# Patient Record
Sex: MALE | Race: WHITE | Employment: UNEMPLOYED | URBAN - METROPOLITAN AREA
[De-identification: names, ages, dates, MRNs, and addresses within clinical notes are randomized per-mention and may not be internally consistent; named-entity substitution may affect disease eponyms.]

---

## 2017-02-07 ENCOUNTER — TELEPHONE (OUTPATIENT)
Dept: FAMILY MEDICINE CLINIC | Facility: CLINIC | Age: 52
End: 2017-02-07

## 2017-02-07 PROBLEM — I10 ESSENTIAL HYPERTENSION: Status: ACTIVE | Noted: 2017-02-07

## 2017-02-07 PROBLEM — E66.3 OVERWEIGHT: Status: ACTIVE | Noted: 2017-02-07

## 2017-02-07 PROBLEM — F17.200 TOBACCO USE DISORDER: Status: ACTIVE | Noted: 2017-02-07

## 2017-02-07 RX ORDER — ALBUTEROL SULFATE 90 UG/1
AEROSOL, METERED RESPIRATORY (INHALATION) AS NEEDED
COMMUNITY
Start: 2012-10-31 | End: 2017-12-27

## 2017-02-07 RX ORDER — ALBUTEROL SULFATE 2.5 MG/3ML
SOLUTION RESPIRATORY (INHALATION) AS NEEDED
COMMUNITY
Start: 2012-11-14 | End: 2017-12-27

## 2017-02-07 RX ORDER — ATORVASTATIN CALCIUM 10 MG/1
10 TABLET, FILM COATED ORAL NIGHTLY
COMMUNITY
Start: 2013-12-04 | End: 2017-03-14

## 2017-02-07 RX ORDER — VALSARTAN AND HYDROCHLOROTHIAZIDE 320; 25 MG/1; MG/1
1 TABLET, FILM COATED ORAL DAILY
COMMUNITY
Start: 2013-12-04 | End: 2017-05-15

## 2017-02-07 RX ORDER — LEVOTHYROXINE SODIUM 0.12 MG/1
125 TABLET ORAL
COMMUNITY
Start: 2016-05-23 | End: 2017-03-14

## 2017-02-07 RX ORDER — TRAMADOL HYDROCHLORIDE 50 MG/1
TABLET ORAL
COMMUNITY
Start: 2016-05-19 | End: 2017-10-10

## 2017-02-07 NOTE — TELEPHONE ENCOUNTER
Pt stated that mole on left arm is changing, pt has noticed growth (approx pea size) and reports red edging. Pt asked for appt w/ CR only, appt given w/ CR on 2/14.     Future Appointments  Date Time Provider Willem Romero   2/14/2017 11:15 AM [place]

## 2017-02-14 ENCOUNTER — OFFICE VISIT (OUTPATIENT)
Dept: FAMILY MEDICINE CLINIC | Facility: CLINIC | Age: 52
End: 2017-02-14

## 2017-02-14 ENCOUNTER — APPOINTMENT (OUTPATIENT)
Dept: LAB | Age: 52
End: 2017-02-14
Attending: FAMILY MEDICINE
Payer: COMMERCIAL

## 2017-02-14 VITALS
HEIGHT: 69.5 IN | BODY MASS INDEX: 42.59 KG/M2 | TEMPERATURE: 97 F | DIASTOLIC BLOOD PRESSURE: 68 MMHG | WEIGHT: 294.19 LBS | HEART RATE: 80 BPM | RESPIRATION RATE: 16 BRPM | SYSTOLIC BLOOD PRESSURE: 106 MMHG

## 2017-02-14 DIAGNOSIS — L82.1 KERATOSIS SEBORRHEICA: Primary | ICD-10-CM

## 2017-02-14 DIAGNOSIS — E03.9 HYPOTHYROIDISM, UNSPECIFIED TYPE: ICD-10-CM

## 2017-02-14 LAB — TSI SER-ACNC: 0.72 MIU/ML (ref 0.35–5.5)

## 2017-02-14 PROCEDURE — 17110 DESTRUCTION B9 LES UP TO 14: CPT | Performed by: FAMILY MEDICINE

## 2017-02-14 PROCEDURE — 84443 ASSAY THYROID STIM HORMONE: CPT

## 2017-02-14 PROCEDURE — 99212 OFFICE O/P EST SF 10 MIN: CPT | Performed by: FAMILY MEDICINE

## 2017-02-14 RX ORDER — GABAPENTIN 100 MG/1
100 CAPSULE ORAL
COMMUNITY
End: 2017-05-10 | Stop reason: DRUGHIGH

## 2017-02-14 NOTE — PROGRESS NOTES
[facility] [place]  PROGRESS NOTE  Chief Complaint:   Patient presents with:  Skin: mole changing- is larged with red edging- L forearm      HPI:   This is a 50year old male coming in for eval of skin lesion.  Pt noted lesion initially brown and Oral Tab Take 10 mg by mouth nightly.    Disp:  Rfl:    Levothyroxine Sodium (SYNTHROID) 125 MCG Oral Tab Take 125 mcg by mouth before breakfast.   Disp:  Rfl:    TraMADol HCl 50 MG Oral Tab 1-2 tabs   1-2 times per day  Disp:  Rfl:    Valsartan-Hydrochloro 02/14/1967  Diabetes Care Dilated Eye Exam due on 02/14/1967  Annual Physical due on 02/14/1969  Influenza Vaccine(1) due on 09/01/2016  Colonoscopy,10 Years due on 02/14/2017  PSA due on 02/14/2017      Patient/Caregiver Education: Patient/Caregiver Anny Michael

## 2017-02-14 NOTE — PATIENT INSTRUCTIONS
Keep skin clean and dry. Antibiotic ointment bid to area til healed.  Recheck if redness, drainage or other concern

## 2017-02-14 NOTE — PROCEDURES
Skin lesion right forearm , cleaned and had small amount of k-Y applied. Cryotherapy to area for 20 seconds x 2 applications  Skin lesion left forearm, cleaned and applied small amount K_Y. Cryotherapy to area for 30 seconds x 2 applications.   Pt tolerated

## 2017-02-15 ENCOUNTER — TELEPHONE (OUTPATIENT)
Dept: FAMILY MEDICINE CLINIC | Facility: CLINIC | Age: 52
End: 2017-02-15

## 2017-02-15 NOTE — TELEPHONE ENCOUNTER
----- Message from Marley England MD sent at 2/14/2017  7:26 PM CST -----  tsh now in low normal range. Pt to continue present dose of synthroid.

## 2017-03-15 RX ORDER — ATORVASTATIN CALCIUM 10 MG/1
TABLET, FILM COATED ORAL
Qty: 90 TABLET | Refills: 0 | Status: SHIPPED | OUTPATIENT
Start: 2017-03-15 | End: 2017-08-07

## 2017-03-15 RX ORDER — LEVOTHYROXINE SODIUM 0.12 MG/1
TABLET ORAL
Qty: 90 TABLET | Refills: 0 | Status: SHIPPED | OUTPATIENT
Start: 2017-03-15 | End: 2017-06-20

## 2017-05-10 ENCOUNTER — HOSPITAL ENCOUNTER (OUTPATIENT)
Dept: GENERAL RADIOLOGY | Age: 52
Discharge: HOME | End: 2017-05-10
Attending: FAMILY MEDICINE
Payer: COMMERCIAL

## 2017-05-10 ENCOUNTER — OFFICE VISIT (OUTPATIENT)
Dept: FAMILY MEDICINE CLINIC | Facility: CLINIC | Age: 52
End: 2017-05-10

## 2017-05-10 ENCOUNTER — TELEPHONE (OUTPATIENT)
Dept: FAMILY MEDICINE CLINIC | Facility: CLINIC | Age: 52
End: 2017-05-10

## 2017-05-10 VITALS
WEIGHT: 302.63 LBS | DIASTOLIC BLOOD PRESSURE: 68 MMHG | SYSTOLIC BLOOD PRESSURE: 102 MMHG | TEMPERATURE: 98 F | RESPIRATION RATE: 20 BRPM | HEIGHT: 69.25 IN | HEART RATE: 92 BPM | BODY MASS INDEX: 44.31 KG/M2

## 2017-05-10 DIAGNOSIS — M19.042 PRIMARY OSTEOARTHRITIS OF LEFT HAND: ICD-10-CM

## 2017-05-10 DIAGNOSIS — M79.645 THUMB PAIN, LEFT: ICD-10-CM

## 2017-05-10 DIAGNOSIS — M79.645 THUMB PAIN, LEFT: Primary | ICD-10-CM

## 2017-05-10 DIAGNOSIS — M65.242: ICD-10-CM

## 2017-05-10 DIAGNOSIS — M72.2 PLANTAR FASCIITIS, BILATERAL: ICD-10-CM

## 2017-05-10 PROCEDURE — 73140 X-RAY EXAM OF FINGER(S): CPT | Performed by: FAMILY MEDICINE

## 2017-05-10 PROCEDURE — 99214 OFFICE O/P EST MOD 30 MIN: CPT | Performed by: FAMILY MEDICINE

## 2017-05-10 RX ORDER — GABAPENTIN 300 MG/1
300 CAPSULE ORAL DAILY
Refills: 1 | COMMUNITY
Start: 2017-03-31

## 2017-05-10 NOTE — PATIENT INSTRUCTIONS
Nsaid --for 2 weeks    2 aleve bid with food for 2 weeks  Ice after working    If no better 2 weeks- then referral- hand surgeon    Podiatry senait at [facility] for plantar fascititis

## 2017-05-10 NOTE — PROGRESS NOTES
Patricia Abdalla is a 50year old male. HPI:  Thumb pain over ast 2-3 weeks. In last week -seems to \"go out of joint\" got stuck, massaged and went back in positions    No numnbnness  No injury recalled.  Takes nsaids chronically for back pain- no extra Temp(Src) 98.1 °F (36.7 °C) (Tympanic)  Resp 20  Ht 69.25\"  Wt 302 lb 10 oz  BMI 44.37 kg/m2  GENERAL: well developed, well nourished,in no apparent distress  EXTREMITIES: left thum tender Proximal pip joint. No swelling some decrease opposition.   Feet- f

## 2017-05-11 NOTE — TELEPHONE ENCOUNTER
----- Message from Susy Elizondo MD sent at 5/10/2017  5:36 PM CDT -----  Negative xray0 pt to continue with nsaids, ice and rest as discussed at appt today

## 2017-05-15 RX ORDER — VALSARTAN AND HYDROCHLOROTHIAZIDE 320; 25 MG/1; MG/1
TABLET, FILM COATED ORAL
Qty: 90 TABLET | Refills: 2 | Status: SHIPPED | OUTPATIENT
Start: 2017-05-15 | End: 2017-10-10

## 2017-06-05 ENCOUNTER — TELEPHONE (OUTPATIENT)
Dept: FAMILY MEDICINE CLINIC | Facility: CLINIC | Age: 52
End: 2017-06-05

## 2017-06-05 DIAGNOSIS — M79.645 THUMB PAIN, LEFT: Primary | ICD-10-CM

## 2017-06-05 NOTE — TELEPHONE ENCOUNTER
According to Dr. Jeremiah Arreola's note-she did want him to see a hand surgeon. Referral entered for Evaristo Butler name is on there for patients should see hand specialist.  Please give patient contact information for [facility].   Also patient should  a

## 2017-06-05 NOTE — TELEPHONE ENCOUNTER
Pt was seen on 5/10- states he hasn't noticed any improvement with NSAID therapy re: L thumb pain. Plan from 5/10- mentioned referral to hand surgeon if no improvement, please advise.

## 2017-06-20 RX ORDER — LEVOTHYROXINE SODIUM 0.12 MG/1
TABLET ORAL
Qty: 90 TABLET | Refills: 2 | Status: SHIPPED | OUTPATIENT
Start: 2017-06-20 | End: 2017-10-10

## 2017-08-07 DIAGNOSIS — E11.9 CONTROLLED TYPE 2 DIABETES MELLITUS WITHOUT COMPLICATION, WITHOUT LONG-TERM CURRENT USE OF INSULIN (HCC): Primary | ICD-10-CM

## 2017-08-08 RX ORDER — ATORVASTATIN CALCIUM 10 MG/1
TABLET, FILM COATED ORAL
Qty: 90 TABLET | Refills: 0 | Status: SHIPPED | OUTPATIENT
Start: 2017-08-08 | End: 2017-10-10

## 2017-08-08 NOTE — TELEPHONE ENCOUNTER
Last physical was in May 2016, please notify patient is due for a physical and fasting lab work.   He may schedule his lab appointment a few days before his physical.  Refill for atorvastatin was sent

## 2017-08-09 NOTE — TELEPHONE ENCOUNTER
Future Appointments  Date Time Provider Willem Heather   9/7/2017 10:45 AM REF [place] [facility]   9/12/2017 2:00 PM Chao Esqueda MD [facility] [place] [facility] [place]     Pt called back, is now scheduled, will need fasting last order please.

## 2017-10-03 ENCOUNTER — LABORATORY ENCOUNTER (OUTPATIENT)
Dept: LAB | Age: 52
End: 2017-10-03
Attending: FAMILY MEDICINE
Payer: COMMERCIAL

## 2017-10-03 DIAGNOSIS — E11.9 CONTROLLED TYPE 2 DIABETES MELLITUS WITHOUT COMPLICATION, WITHOUT LONG-TERM CURRENT USE OF INSULIN (HCC): ICD-10-CM

## 2017-10-03 PROCEDURE — 84439 ASSAY OF FREE THYROXINE: CPT | Performed by: FAMILY MEDICINE

## 2017-10-03 PROCEDURE — 80061 LIPID PANEL: CPT | Performed by: FAMILY MEDICINE

## 2017-10-03 PROCEDURE — 82550 ASSAY OF CK (CPK): CPT | Performed by: FAMILY MEDICINE

## 2017-10-03 PROCEDURE — 82043 UR ALBUMIN QUANTITATIVE: CPT | Performed by: FAMILY MEDICINE

## 2017-10-03 PROCEDURE — 82570 ASSAY OF URINE CREATININE: CPT | Performed by: FAMILY MEDICINE

## 2017-10-03 PROCEDURE — 80050 GENERAL HEALTH PANEL: CPT | Performed by: FAMILY MEDICINE

## 2017-10-03 PROCEDURE — 83036 HEMOGLOBIN GLYCOSYLATED A1C: CPT | Performed by: FAMILY MEDICINE

## 2017-10-03 PROCEDURE — 36415 COLL VENOUS BLD VENIPUNCTURE: CPT | Performed by: FAMILY MEDICINE

## 2017-10-03 PROCEDURE — 81003 URINALYSIS AUTO W/O SCOPE: CPT | Performed by: FAMILY MEDICINE

## 2017-10-10 ENCOUNTER — OFFICE VISIT (OUTPATIENT)
Dept: FAMILY MEDICINE CLINIC | Facility: CLINIC | Age: 52
End: 2017-10-10

## 2017-10-10 VITALS
BODY MASS INDEX: 45.32 KG/M2 | SYSTOLIC BLOOD PRESSURE: 136 MMHG | WEIGHT: 313 LBS | RESPIRATION RATE: 24 BRPM | DIASTOLIC BLOOD PRESSURE: 78 MMHG | TEMPERATURE: 98 F | HEART RATE: 88 BPM | HEIGHT: 69.75 IN

## 2017-10-10 DIAGNOSIS — M54.41 CHRONIC MIDLINE LOW BACK PAIN WITH BILATERAL SCIATICA: ICD-10-CM

## 2017-10-10 DIAGNOSIS — IMO0001 CLASS 3 OBESITY DUE TO EXCESS CALORIES WITH SERIOUS COMORBIDITY AND BODY MASS INDEX (BMI) OF 45.0 TO 49.9 IN ADULT: ICD-10-CM

## 2017-10-10 DIAGNOSIS — E78.49 FAMILIAL MULTIPLE LIPOPROTEIN-TYPE HYPERLIPIDEMIA: ICD-10-CM

## 2017-10-10 DIAGNOSIS — E03.9 HYPOTHYROIDISM, UNSPECIFIED TYPE: ICD-10-CM

## 2017-10-10 DIAGNOSIS — F17.200 TOBACCO USE DISORDER: ICD-10-CM

## 2017-10-10 DIAGNOSIS — G89.29 CHRONIC MIDLINE LOW BACK PAIN WITH BILATERAL SCIATICA: ICD-10-CM

## 2017-10-10 DIAGNOSIS — M54.42 CHRONIC MIDLINE LOW BACK PAIN WITH BILATERAL SCIATICA: ICD-10-CM

## 2017-10-10 DIAGNOSIS — I10 ESSENTIAL HYPERTENSION: ICD-10-CM

## 2017-10-10 DIAGNOSIS — G47.30 SLEEP APNEA, UNSPECIFIED TYPE: ICD-10-CM

## 2017-10-10 DIAGNOSIS — Z00.00 ANNUAL PHYSICAL EXAM: Primary | ICD-10-CM

## 2017-10-10 DIAGNOSIS — K63.5 POLYP OF COLON, UNSPECIFIED PART OF COLON, UNSPECIFIED TYPE: ICD-10-CM

## 2017-10-10 DIAGNOSIS — E11.9 CONTROLLED TYPE 2 DIABETES MELLITUS WITHOUT COMPLICATION, WITHOUT LONG-TERM CURRENT USE OF INSULIN (HCC): ICD-10-CM

## 2017-10-10 PROCEDURE — 99396 PREV VISIT EST AGE 40-64: CPT | Performed by: FAMILY MEDICINE

## 2017-10-10 PROCEDURE — 93000 ELECTROCARDIOGRAM COMPLETE: CPT | Performed by: FAMILY MEDICINE

## 2017-10-10 RX ORDER — ATORVASTATIN CALCIUM 10 MG/1
TABLET, FILM COATED ORAL
Qty: 90 TABLET | Refills: 2 | Status: SHIPPED | OUTPATIENT
Start: 2017-10-10 | End: 2018-11-12

## 2017-10-10 RX ORDER — VALSARTAN AND HYDROCHLOROTHIAZIDE 320; 25 MG/1; MG/1
TABLET, FILM COATED ORAL
Qty: 90 TABLET | Refills: 2 | Status: SHIPPED | OUTPATIENT
Start: 2017-10-10 | End: 2018-07-24 | Stop reason: RX

## 2017-10-10 RX ORDER — LEVOTHYROXINE SODIUM 0.12 MG/1
TABLET ORAL
Qty: 90 TABLET | Refills: 2 | Status: SHIPPED | OUTPATIENT
Start: 2017-10-10 | End: 2018-10-28

## 2017-10-10 NOTE — PATIENT INSTRUCTIONS
rec glucometer and glucose testing --testing 1-2 a  Day  [place] [facility]-- pt wants to check with wife before new Rx given. rec cardiac stress testing- lexiscan- pt to consider. Did not want to schedule at this time    Recheck 3-4 months -- labs then

## 2017-10-10 NOTE — PROGRESS NOTES
[address]  PROGRESS NOTE  Chief Complaint:   Patient presents with:   Well Adult: will need medication refill      HPI:   This is a 50year old male coming in for  Physical    Pt saw neurology- / Jenny Diaz- Had EMG, Dr. Manisha Meyers treating him 5.500 mIU/mL   -URINALYSIS, ROUTINE   Result Value Ref Range   Urine Color Yellow Yellow   Clarity Urine Clear Clear   Spec Gravity 1.030 1.001 - 1.030   Glucose Urine Negative Negative mg/dl   Bilirubin Urine Negative Negative   Ketones Urine Negative Neg Current Every Day Smoker                                                     Packs/day: 0.25      Years: 0.00           Last attempt to quit: 2/12/2017    Smokeless tobacco: Never Used                        Comment: 5 cig. per day    Alcohol use:  Yes chest discomfort, palpitations, edema, dyspnea on exertion or at rest.  RESPIRATORY:  Denies shortness of breath, wheezing, cough or sputum. GASTROINTESTINAL:  Denies abdominal pain, nausea, vomiting, constipation, diarrhea, or blood in stool.   Ramon Green tenderness, no spasm, SLR test negative, FROM. EXTREMITIES:  No edema, no cyanosis, no clubbing, FROM, 2+ dorsalis pedis pulses bilaterally. NEURO:  No deficit, normal gait, strength and tone, sensory intact, normal reflexes. ASSESSMENT AND PLAN:   1. appt.              Meds & Refills for this Visit:  Signed Prescriptions Disp Refills    Valsartan-Hydrochlorothiazide 320-25 MG Oral Tab 90 tablet 2      Sig: TAKE ONE TABLET BY MOUTH ONCE DAILY      Levothyroxine Sodium 125 MCG Oral Tab 90 tablet 2      S

## 2017-12-18 ENCOUNTER — APPOINTMENT (OUTPATIENT)
Dept: LAB | Age: 52
End: 2017-12-18
Attending: FAMILY MEDICINE
Payer: COMMERCIAL

## 2017-12-18 ENCOUNTER — OFFICE VISIT (OUTPATIENT)
Dept: FAMILY MEDICINE CLINIC | Facility: CLINIC | Age: 52
End: 2017-12-18

## 2017-12-18 VITALS
DIASTOLIC BLOOD PRESSURE: 84 MMHG | SYSTOLIC BLOOD PRESSURE: 146 MMHG | RESPIRATION RATE: 18 BRPM | BODY MASS INDEX: 44.13 KG/M2 | HEIGHT: 69.5 IN | HEART RATE: 96 BPM | WEIGHT: 304.81 LBS | TEMPERATURE: 98 F

## 2017-12-18 DIAGNOSIS — R35.0 URINARY FREQUENCY: ICD-10-CM

## 2017-12-18 DIAGNOSIS — R39.15 URGENCY OF URINATION: Primary | ICD-10-CM

## 2017-12-18 DIAGNOSIS — Z12.5 PROSTATE CANCER SCREENING: ICD-10-CM

## 2017-12-18 PROCEDURE — 99214 OFFICE O/P EST MOD 30 MIN: CPT | Performed by: FAMILY MEDICINE

## 2017-12-18 PROCEDURE — 80048 BASIC METABOLIC PNL TOTAL CA: CPT | Performed by: FAMILY MEDICINE

## 2017-12-18 PROCEDURE — 84153 ASSAY OF PSA TOTAL: CPT | Performed by: FAMILY MEDICINE

## 2017-12-18 PROCEDURE — 36415 COLL VENOUS BLD VENIPUNCTURE: CPT | Performed by: FAMILY MEDICINE

## 2017-12-18 PROCEDURE — 81003 URINALYSIS AUTO W/O SCOPE: CPT | Performed by: FAMILY MEDICINE

## 2017-12-18 NOTE — PROGRESS NOTES
[facility] [place]  PROGRESS NOTE  Chief Complaint:   Patient presents with:  Urinary: pt has urge to urinate frequently, started a week ago      HPI:   This is a 50year old male presents complaining of for urinary urgency and increase in [place] History Narrative    None on file      Family History:  Family History   Problem Relation Age of Onset   • Hypertension Father    • Lipids Father    • Obesity Father    • Lung Disorder Father    • Hypertension Mother    • Lipids Mother    • Obesity Mother polyuria or polydipsia.       EXAM:   /84 (BP Location: Left arm, Patient Position: Sitting, Cuff Size: large)   Pulse 96   Temp (!) 97.5 °F (36.4 °C) (Temporal)   Resp 18   Ht 69.5\"   Wt (!) 304 lb 12.8 oz   BMI 44.37 kg/m²  Estimated body mass inde 02/14/1979  Pneumococcal PPSV23 Medium Risk Adult(1 of 1 - PPSV23) due on 02/14/1986  PSA due on 02/14/2017  Influenza Vaccine(1) due on 09/01/2017    Patient/Caregiver Education: Patient/Caregiver Education: There are no barriers to learning.  Medical educ

## 2017-12-18 NOTE — PATIENT INSTRUCTIONS
Discussed possible cause of urinary symptoms. Could be due to enlarged prostate. Infection is also small possibility. Will check labs. Consider urology referral if no improvement.

## 2017-12-19 ENCOUNTER — TELEPHONE (OUTPATIENT)
Dept: FAMILY MEDICINE CLINIC | Facility: CLINIC | Age: 52
End: 2017-12-19

## 2017-12-19 NOTE — TELEPHONE ENCOUNTER
----- Message from Erica Rodriguez MD sent at 12/18/2017  7:27 PM CST -----  Please inform patient that his PSA level and kidney functions are within normal range.   His urinalysis is within normal range, there is no sign of any infection, culture is not lisa

## 2017-12-27 ENCOUNTER — OFFICE VISIT (OUTPATIENT)
Dept: FAMILY MEDICINE CLINIC | Facility: CLINIC | Age: 52
End: 2017-12-27

## 2017-12-27 VITALS
TEMPERATURE: 97 F | WEIGHT: 304.38 LBS | SYSTOLIC BLOOD PRESSURE: 122 MMHG | DIASTOLIC BLOOD PRESSURE: 78 MMHG | HEART RATE: 91 BPM | RESPIRATION RATE: 20 BRPM | OXYGEN SATURATION: 97 % | BODY MASS INDEX: 44 KG/M2

## 2017-12-27 DIAGNOSIS — J40 BRONCHITIS: Primary | ICD-10-CM

## 2017-12-27 DIAGNOSIS — F17.200 TOBACCO USE DISORDER: ICD-10-CM

## 2017-12-27 DIAGNOSIS — E11.9 CONTROLLED TYPE 2 DIABETES MELLITUS WITHOUT COMPLICATION, WITHOUT LONG-TERM CURRENT USE OF INSULIN (HCC): ICD-10-CM

## 2017-12-27 PROBLEM — M54.10 RADICULAR PAIN OF BOTH LOWER EXTREMITIES: Status: ACTIVE | Noted: 2017-06-16

## 2017-12-27 PROBLEM — M53.3 SACRAL PAIN: Status: ACTIVE | Noted: 2017-07-27

## 2017-12-27 PROCEDURE — 99213 OFFICE O/P EST LOW 20 MIN: CPT | Performed by: NURSE PRACTITIONER

## 2017-12-27 RX ORDER — AMOXICILLIN AND CLAVULANATE POTASSIUM 875; 125 MG/1; MG/1
1 TABLET, FILM COATED ORAL 2 TIMES DAILY
Qty: 20 TABLET | Refills: 0 | Status: SHIPPED | OUTPATIENT
Start: 2017-12-27 | End: 2018-01-06

## 2017-12-27 RX ORDER — ALBUTEROL SULFATE 2.5 MG/3ML
2.5 SOLUTION RESPIRATORY (INHALATION) EVERY 4 HOURS PRN
Qty: 1 BOX | Refills: 0 | Status: SHIPPED | OUTPATIENT
Start: 2017-12-27

## 2017-12-27 RX ORDER — ALBUTEROL SULFATE 90 UG/1
2 AEROSOL, METERED RESPIRATORY (INHALATION) EVERY 4 HOURS PRN
Qty: 18 G | Refills: 0 | Status: SHIPPED | OUTPATIENT
Start: 2017-12-27

## 2017-12-27 NOTE — PROGRESS NOTES
HPI:    Patient ID: Kasia Adkins is a 50year old male. HPI     States that he is present with cough that started yesterday. Is getting worse. Is a smoker and trying to quit. Tends to get this every year. Ended up in the ER.    Took some codeine co atraumatic. Right Ear: Hearing, external ear and ear canal normal. Tympanic membrane is injected. Left Ear: Hearing, tympanic membrane, external ear and ear canal normal.   Nose: Mucosal edema present. Right sinus exhibits frontal sinus tenderness.  Lef nebulization every 4 (four) hours as needed. Imaging & Referrals:  None      Patient Instructions   Directed to take antibiotics until gone. Recommend to eat with the antibiotic. Treat symptoms as needed.    Return to clinic if not better in 48-

## 2017-12-28 ENCOUNTER — TELEPHONE (OUTPATIENT)
Dept: FAMILY MEDICINE CLINIC | Facility: CLINIC | Age: 52
End: 2017-12-28

## 2017-12-28 RX ORDER — PROMETHAZINE HYDROCHLORIDE AND CODEINE PHOSPHATE 6.25; 1 MG/5ML; MG/5ML
5 SYRUP ORAL EVERY 4 HOURS PRN
Qty: 120 ML | Refills: 0 | Status: SHIPPED
Start: 2017-12-28 | End: 2018-01-17 | Stop reason: ALTCHOICE

## 2017-12-28 NOTE — TELEPHONE ENCOUNTER
was seen yesterday   and cough is worse      requesting Cough syrup     RX to [facility] in [place]          please call to confirm this has been approved and sent

## 2018-01-09 ENCOUNTER — OFFICE VISIT (OUTPATIENT)
Dept: FAMILY MEDICINE CLINIC | Facility: CLINIC | Age: 53
End: 2018-01-09

## 2018-01-09 VITALS
WEIGHT: 307.38 LBS | HEART RATE: 74 BPM | DIASTOLIC BLOOD PRESSURE: 88 MMHG | BODY MASS INDEX: 44.51 KG/M2 | TEMPERATURE: 98 F | HEIGHT: 69.5 IN | SYSTOLIC BLOOD PRESSURE: 130 MMHG | RESPIRATION RATE: 18 BRPM | OXYGEN SATURATION: 97 %

## 2018-01-09 DIAGNOSIS — J06.9 UPPER RESPIRATORY TRACT INFECTION, UNSPECIFIED TYPE: Primary | ICD-10-CM

## 2018-01-09 DIAGNOSIS — J02.9 SORE THROAT: ICD-10-CM

## 2018-01-09 LAB
CONTROL LINE PRESENT WITH A CLEAR BACKGROUND (YES/NO): YES YES/NO
STREP GRP A CUL-SCR: NEGATIVE

## 2018-01-09 PROCEDURE — 99213 OFFICE O/P EST LOW 20 MIN: CPT | Performed by: NURSE PRACTITIONER

## 2018-01-09 PROCEDURE — 87880 STREP A ASSAY W/OPTIC: CPT | Performed by: NURSE PRACTITIONER

## 2018-01-09 PROCEDURE — 87081 CULTURE SCREEN ONLY: CPT | Performed by: NURSE PRACTITIONER

## 2018-01-09 RX ORDER — AZITHROMYCIN 250 MG/1
TABLET, FILM COATED ORAL
Qty: 6 TABLET | Refills: 0 | Status: SHIPPED | OUTPATIENT
Start: 2018-01-09 | End: 2018-01-17 | Stop reason: ALTCHOICE

## 2018-01-11 ENCOUNTER — TELEPHONE (OUTPATIENT)
Dept: FAMILY MEDICINE CLINIC | Facility: CLINIC | Age: 53
End: 2018-01-11

## 2018-01-11 NOTE — TELEPHONE ENCOUNTER
----- Message from DAISY Burgos sent at 1/11/2018  9:59 AM CST -----  Strep is negative. Please let patient know. Thank you.  Yolanda Thompson, 01/11/18, 9:59 AM

## 2018-01-17 ENCOUNTER — OFFICE VISIT (OUTPATIENT)
Dept: FAMILY MEDICINE CLINIC | Facility: CLINIC | Age: 53
End: 2018-01-17

## 2018-01-17 VITALS
TEMPERATURE: 99 F | RESPIRATION RATE: 18 BRPM | HEIGHT: 69.5 IN | DIASTOLIC BLOOD PRESSURE: 84 MMHG | HEART RATE: 64 BPM | BODY MASS INDEX: 44.76 KG/M2 | SYSTOLIC BLOOD PRESSURE: 150 MMHG | WEIGHT: 309.13 LBS

## 2018-01-17 DIAGNOSIS — R73.9 HYPERGLYCEMIA: ICD-10-CM

## 2018-01-17 DIAGNOSIS — N40.1 BENIGN PROSTATIC HYPERPLASIA WITH URINARY FREQUENCY: Primary | ICD-10-CM

## 2018-01-17 DIAGNOSIS — R35.0 BENIGN PROSTATIC HYPERPLASIA WITH URINARY FREQUENCY: Primary | ICD-10-CM

## 2018-01-17 DIAGNOSIS — E11.9 CONTROLLED TYPE 2 DIABETES MELLITUS WITHOUT COMPLICATION, WITHOUT LONG-TERM CURRENT USE OF INSULIN (HCC): ICD-10-CM

## 2018-01-17 PROCEDURE — 99213 OFFICE O/P EST LOW 20 MIN: CPT | Performed by: FAMILY MEDICINE

## 2018-01-18 NOTE — PROGRESS NOTES
[address]  PROGRESS NOTE  Chief Complaint:   Patient presents with: Other: Having prostate issues      HPI:   This is a 50year old male coming in for medical f.u- recent flu- resolved.     This is a 50year old male presents complainin Current Some Day Smoker                                                      Packs/day: 0.25      Years: 0.00        Smokeless tobacco: Never Used                        Comment: 5 cig. per day; not smoking while sick              12/27/17    Alcohol use: throat. INTEGUMENTARY:  Denies rashes, itching, skin lesion, or excessive skin dryness.   CARDIOVASCULAR:  Denies chest pain, chest pressure, chest discomfort, palpitations, edema, dyspnea on exertion or at rest.  RESPIRATORY:  Denies shortness of breath, [facility])        Problem List:  Patient Active Problem List:     Low back pain     Degeneration of lumbar or lumbosacral intervertebral disc     Diabetes mellitus type II, controlled ([address]     Familial multiple lipoprotein-type hyperlipidemia     Essential hyp

## 2018-01-18 NOTE — PATIENT INSTRUCTIONS
Trial Flomax daily pending urology consult    Urology consult -  med group    rec check glucose -- accucheck    rec fasting labs and medical f.u this spring

## 2018-01-19 ENCOUNTER — TELEPHONE (OUTPATIENT)
Dept: FAMILY MEDICINE CLINIC | Facility: CLINIC | Age: 53
End: 2018-01-19

## 2018-01-19 RX ORDER — TAMSULOSIN HYDROCHLORIDE 0.4 MG/1
0.4 CAPSULE ORAL DAILY
Qty: 30 CAPSULE | Refills: 0 | Status: SHIPPED | OUTPATIENT
Start: 2018-01-19 | End: 2018-02-18

## 2018-01-19 NOTE — TELEPHONE ENCOUNTER
was under the impression that he was going to get a rx for flomax- went to  at pharmacy but it is not there

## 2018-01-19 NOTE — TELEPHONE ENCOUNTER
Patient is correct. Prescription was entered when he was here however for some reason it did not seem to capture. Will reenter and send at this time.

## 2018-01-19 NOTE — TELEPHONE ENCOUNTER
Pt was seen on /17-  Pt states he was supposed to start trial of Flomax pending urology consult. Uses [facility].

## 2018-03-17 ENCOUNTER — OFFICE VISIT (OUTPATIENT)
Dept: FAMILY MEDICINE CLINIC | Facility: CLINIC | Age: 53
End: 2018-03-17

## 2018-03-17 VITALS
RESPIRATION RATE: 20 BRPM | BODY MASS INDEX: 45.61 KG/M2 | OXYGEN SATURATION: 98 % | SYSTOLIC BLOOD PRESSURE: 132 MMHG | HEIGHT: 69.5 IN | TEMPERATURE: 97 F | DIASTOLIC BLOOD PRESSURE: 86 MMHG | WEIGHT: 315 LBS | HEART RATE: 73 BPM

## 2018-03-17 DIAGNOSIS — J06.9 UPPER RESPIRATORY TRACT INFECTION, UNSPECIFIED TYPE: ICD-10-CM

## 2018-03-17 DIAGNOSIS — R05.9 COUGH: Primary | ICD-10-CM

## 2018-03-17 PROCEDURE — 99213 OFFICE O/P EST LOW 20 MIN: CPT | Performed by: FAMILY MEDICINE

## 2018-03-17 RX ORDER — BENZONATATE 200 MG/1
200 CAPSULE ORAL 3 TIMES DAILY PRN
Qty: 30 CAPSULE | Refills: 0 | Status: SHIPPED | OUTPATIENT
Start: 2018-03-17 | End: 2018-08-08 | Stop reason: ALTCHOICE

## 2018-03-17 NOTE — PATIENT INSTRUCTIONS
Likely viral cold. Recommend rest, plenty of fluids. Continue with albuterol as needed   Use benzonatate as needed for cough. Return to clinic in 1 weeks if no improvement. Sooner if symptoms gets worse.

## 2018-03-17 NOTE — PROGRESS NOTES
[facility] [place]  PROGRESS NOTE  Chief Complaint:   Patient presents with:  Cough      HPI:   This is a 48year old male presents complaining of mild nasal congestion and cough that has been present for past 2 days.   Denies any shortness of br Outpatient Prescriptions:  benzonatate 200 MG Oral Cap Take 1 capsule (200 mg total) by mouth 3 (three) times daily as needed for cough.  Disp: 30 capsule Rfl: 0   Albuterol Sulfate HFA (PROAIR HFA) 108 (90 Base) MCG/ACT Inhalation Aero Soln Inhale 2 puffs kg/m²  Estimated body mass index is 46.67 kg/m² as calculated from the following:    Height as of this encounter: 69.5\". Weight as of this encounter: 320 lb 9.6 oz. Vital signs reviewed.     GEN:  Patient is alert, awake and oriented, well developed, 02/14/1979  Pneumococcal PPSV23 Medium Risk Adult(1 of 1 - PPSV23) due on 02/14/1986  Influenza Vaccine(1) due on 09/01/2017    Patient/Caregiver Education: Patient/Caregiver Education: There are no barriers to learning. Medical education done.    Outcome:

## 2018-07-07 ENCOUNTER — APPOINTMENT (OUTPATIENT)
Dept: LAB | Age: 53
End: 2018-07-07
Attending: FAMILY MEDICINE
Payer: COMMERCIAL

## 2018-07-07 DIAGNOSIS — N40.1 BENIGN PROSTATIC HYPERPLASIA WITH URINARY FREQUENCY: ICD-10-CM

## 2018-07-07 DIAGNOSIS — R73.9 HYPERGLYCEMIA: ICD-10-CM

## 2018-07-07 DIAGNOSIS — R35.0 BENIGN PROSTATIC HYPERPLASIA WITH URINARY FREQUENCY: ICD-10-CM

## 2018-07-07 LAB
ALBUMIN SERPL-MCNC: 3.8 G/DL (ref 3.5–4.8)
ALP LIVER SERPL-CCNC: 44 U/L (ref 45–117)
ALT SERPL-CCNC: 44 U/L (ref 17–63)
AST SERPL-CCNC: 25 U/L (ref 15–41)
BILIRUB SERPL-MCNC: 0.5 MG/DL (ref 0.1–2)
BUN BLD-MCNC: 18 MG/DL (ref 8–20)
CALCIUM BLD-MCNC: 9.4 MG/DL (ref 8.3–10.3)
CHLORIDE: 104 MMOL/L (ref 101–111)
CHOLEST SMN-MCNC: 168 MG/DL (ref ?–200)
CO2: 27 MMOL/L (ref 22–32)
CREAT BLD-MCNC: 0.96 MG/DL (ref 0.7–1.3)
EST. AVERAGE GLUCOSE BLD GHB EST-MCNC: 137 MG/DL (ref 68–126)
GLUCOSE BLD-MCNC: 111 MG/DL (ref 70–99)
HBA1C MFR BLD HPLC: 6.4 % (ref ?–5.7)
HDLC SERPL-MCNC: 35 MG/DL (ref 45–?)
HDLC SERPL: 4.8 {RATIO} (ref ?–4.97)
LDLC SERPL CALC-MCNC: 104 MG/DL (ref ?–130)
M PROTEIN MFR SERPL ELPH: 7.5 G/DL (ref 6.1–8.3)
NONHDLC SERPL-MCNC: 133 MG/DL (ref ?–130)
POTASSIUM SERPL-SCNC: 4.1 MMOL/L (ref 3.6–5.1)
SODIUM SERPL-SCNC: 141 MMOL/L (ref 136–144)
TRIGL SERPL-MCNC: 144 MG/DL (ref ?–150)
TSI SER-ACNC: 1.8 MIU/ML (ref 0.35–5.5)
VLDLC SERPL CALC-MCNC: 29 MG/DL (ref 5–40)

## 2018-07-07 PROCEDURE — 83036 HEMOGLOBIN GLYCOSYLATED A1C: CPT | Performed by: FAMILY MEDICINE

## 2018-07-07 PROCEDURE — 36415 COLL VENOUS BLD VENIPUNCTURE: CPT | Performed by: FAMILY MEDICINE

## 2018-07-07 PROCEDURE — 80061 LIPID PANEL: CPT | Performed by: FAMILY MEDICINE

## 2018-07-07 PROCEDURE — 84443 ASSAY THYROID STIM HORMONE: CPT | Performed by: FAMILY MEDICINE

## 2018-07-07 PROCEDURE — 80053 COMPREHEN METABOLIC PANEL: CPT | Performed by: FAMILY MEDICINE

## 2018-07-09 ENCOUNTER — TELEPHONE (OUTPATIENT)
Dept: FAMILY MEDICINE CLINIC | Facility: CLINIC | Age: 53
End: 2018-07-09

## 2018-07-09 NOTE — TELEPHONE ENCOUNTER
Pt informed.     Future Appointments  Date Time Provider Willem Heather   7/18/2018 7:00 PM Humberto Delaney MD [facility] [place] [facility]

## 2018-07-09 NOTE — TELEPHONE ENCOUNTER
----- Message from DAISY Arrieta sent at 7/9/2018  7:09 AM CDT -----  Dr. Jeremiah Arreola's patient–please notify patient that labs are stable, his cholesterol is the same, but triglycerides did improve. A1c is at 6.4.   Patient may discuss this furth

## 2018-07-24 ENCOUNTER — TELEPHONE (OUTPATIENT)
Dept: FAMILY MEDICINE CLINIC | Facility: CLINIC | Age: 53
End: 2018-07-24

## 2018-07-24 RX ORDER — LOSARTAN POTASSIUM AND HYDROCHLOROTHIAZIDE 25; 100 MG/1; MG/1
1 TABLET ORAL DAILY
Qty: 90 TABLET | Refills: 1 | Status: SHIPPED | OUTPATIENT
Start: 2018-07-24 | End: 2019-02-14

## 2018-07-24 NOTE — TELEPHONE ENCOUNTER
Future appt:     Your appointments     Date & Time Appointment Department [facility])    Aug 08, 2018  6:15 PM CDT Exam - Established Patient with Kentrell Schwartz MD [address] [place] [address]        Merline Moeller

## 2018-08-08 ENCOUNTER — OFFICE VISIT (OUTPATIENT)
Dept: FAMILY MEDICINE CLINIC | Facility: CLINIC | Age: 53
End: 2018-08-08
Payer: COMMERCIAL

## 2018-08-08 VITALS
OXYGEN SATURATION: 95 % | TEMPERATURE: 98 F | RESPIRATION RATE: 16 BRPM | HEIGHT: 69.5 IN | WEIGHT: 310.31 LBS | DIASTOLIC BLOOD PRESSURE: 82 MMHG | HEART RATE: 94 BPM | BODY MASS INDEX: 44.93 KG/M2 | SYSTOLIC BLOOD PRESSURE: 132 MMHG

## 2018-08-08 DIAGNOSIS — IMO0001 CLASS 3 OBESITY DUE TO EXCESS CALORIES WITH SERIOUS COMORBIDITY AND BODY MASS INDEX (BMI) OF 45.0 TO 49.9 IN ADULT: ICD-10-CM

## 2018-08-08 DIAGNOSIS — L91.8 SKIN TAG: ICD-10-CM

## 2018-08-08 DIAGNOSIS — E74.39 GLUCOSE INTOLERANCE: ICD-10-CM

## 2018-08-08 DIAGNOSIS — G89.29 CHRONIC MIDLINE LOW BACK PAIN WITH BILATERAL SCIATICA: ICD-10-CM

## 2018-08-08 DIAGNOSIS — E78.49 FAMILIAL MULTIPLE LIPOPROTEIN-TYPE HYPERLIPIDEMIA: ICD-10-CM

## 2018-08-08 DIAGNOSIS — M54.41 CHRONIC MIDLINE LOW BACK PAIN WITH BILATERAL SCIATICA: ICD-10-CM

## 2018-08-08 DIAGNOSIS — N40.0 BENIGN PROSTATIC HYPERPLASIA, UNSPECIFIED WHETHER LOWER URINARY TRACT SYMPTOMS PRESENT: ICD-10-CM

## 2018-08-08 DIAGNOSIS — E03.9 HYPOTHYROIDISM, UNSPECIFIED TYPE: Primary | ICD-10-CM

## 2018-08-08 DIAGNOSIS — M54.42 CHRONIC MIDLINE LOW BACK PAIN WITH BILATERAL SCIATICA: ICD-10-CM

## 2018-08-08 DIAGNOSIS — M54.10 RADICULAR PAIN OF BOTH LOWER EXTREMITIES: ICD-10-CM

## 2018-08-08 DIAGNOSIS — I10 ESSENTIAL HYPERTENSION: ICD-10-CM

## 2018-08-08 PROCEDURE — 99214 OFFICE O/P EST MOD 30 MIN: CPT | Performed by: FAMILY MEDICINE

## 2018-08-08 RX ORDER — FESOTERODINE FUMARATE 4 MG/1
1 TABLET, FILM COATED, EXTENDED RELEASE ORAL DAILY
Refills: 0 | COMMUNITY
Start: 2018-07-26 | End: 2018-09-22 | Stop reason: ALTCHOICE

## 2018-08-08 NOTE — PATIENT INSTRUCTIONS
Referral Dr. Adriane Delatorre- skin tags- pt to call when ready to go.     Continue prostate care Dr. Aimee Cruz     back senait- Kevin- pt to explore options    Trial metformin for weight reduction    Monitor glucose occasionally when able  Glucometer RX    Recheck 3 miya

## 2018-08-08 NOTE — PROGRESS NOTES
[address]  PROGRESS NOTE  Chief Complaint:   Patient presents with: Follow - Up: Review lab results  Diabetes      HPI:   This is a 48year old male coming in for medical followup    Pt continued back issues, neuralgia worse.  Pt ashley Wt Readings from Last 6 Encounters:  08/08/18 : (!) 310 lb 4.8 oz  03/17/18 : (!) 320 lb 9.6 oz  01/17/18 : (!) 309 lb 2 oz  01/09/18 : (!) 307 lb 6.4 oz  12/27/17 : (!) 304 lb 6.4 oz  12/18/17 : (!) 304 lb 12.8 oz    Past Medical History:   Gwendloyn Fillers (90 Base) MCG/ACT Inhalation Aero Soln Inhale 2 puffs into the lungs every 4 (four) hours as needed. Disp: 18 g Rfl: 0   albuterol sulfate (2.5 MG/3ML) 0.083% Inhalation Nebu Soln Take 3 mL (2.5 mg total) by nebulization every 4 (four) hours as needed.  Dis Pulse 94   Temp 98.2 °F (36.8 °C) (Temporal)   Resp 16   Ht 69.5\"   Wt (!) 310 lb 4.8 oz   SpO2 95%   BMI 45.17 kg/m²  Estimated body mass index is 45.17 kg/m² as calculated from the following:    Height as of this encounter: 69.5\".     Weight as of this Familial multiple lipoprotein-type hyperlipidemia      8. Essential hypertension      9.  Skin tag  Referral Dr. Valentine Hartmann when desired      Problem List:  Patient Active Problem List:     Low back pain     Degeneration of lumbar or lumbosacral intervertebral changing symptoms. Patient is to call with any side effects or complications from the treatments as a result of today.

## 2018-09-21 ENCOUNTER — TELEPHONE (OUTPATIENT)
Dept: FAMILY MEDICINE CLINIC | Facility: CLINIC | Age: 53
End: 2018-09-21

## 2018-09-21 NOTE — TELEPHONE ENCOUNTER
Pt states he sees urology and is being tx for overactive bladder. Pt states he is on his third trial of medication- Vesicare which is helping some, but is experiencing lower abdominal/bladder pain, and burning with urination.  Pt states he called urology a

## 2018-09-22 ENCOUNTER — OFFICE VISIT (OUTPATIENT)
Dept: FAMILY MEDICINE CLINIC | Facility: CLINIC | Age: 53
End: 2018-09-22
Payer: COMMERCIAL

## 2018-09-22 VITALS
HEIGHT: 69.5 IN | SYSTOLIC BLOOD PRESSURE: 132 MMHG | BODY MASS INDEX: 44.16 KG/M2 | HEART RATE: 80 BPM | RESPIRATION RATE: 20 BRPM | WEIGHT: 305 LBS | DIASTOLIC BLOOD PRESSURE: 72 MMHG | TEMPERATURE: 97 F

## 2018-09-22 DIAGNOSIS — R30.0 DYSURIA: Primary | ICD-10-CM

## 2018-09-22 DIAGNOSIS — R10.31 RIGHT LOWER QUADRANT ABDOMINAL PAIN: ICD-10-CM

## 2018-09-22 LAB
APPEARANCE: CLEAR
BILIRUB UR QL STRIP.AUTO: NEGATIVE
BILIRUBIN: NEGATIVE
CLARITY UR REFRACT.AUTO: CLEAR
COLOR UR AUTO: YELLOW
GLUCOSE (URINE DIPSTICK): NEGATIVE MG/DL
GLUCOSE UR STRIP.AUTO-MCNC: NEGATIVE MG/DL
KETONES (URINE DIPSTICK): NEGATIVE MG/DL
KETONES UR STRIP.AUTO-MCNC: NEGATIVE MG/DL
LEUKOCYTE ESTERASE UR QL STRIP.AUTO: NEGATIVE
LEUKOCYTES: NEGATIVE
MULTISTIX LOT#: NORMAL NUMERIC
NITRITE UR QL STRIP.AUTO: NEGATIVE
NITRITE, URINE: NEGATIVE
OCCULT BLOOD: NEGATIVE
PH UR STRIP.AUTO: 7 [PH] (ref 4.5–8)
PH, URINE: 7 (ref 4.5–8)
PROT UR STRIP.AUTO-MCNC: NEGATIVE MG/DL
PROTEIN (URINE DIPSTICK): NEGATIVE MG/DL
RBC UR QL AUTO: NEGATIVE
SP GR UR STRIP.AUTO: 1.02 (ref 1–1.03)
SPECIFIC GRAVITY: 1.02 (ref 1–1.03)
URINE-COLOR: YELLOW
UROBILINOGEN UR STRIP.AUTO-MCNC: <2 MG/DL
UROBILINOGEN,SEMI-QN: 0.2 MG/DL (ref 0–1.9)

## 2018-09-22 PROCEDURE — 81003 URINALYSIS AUTO W/O SCOPE: CPT | Performed by: FAMILY MEDICINE

## 2018-09-22 PROCEDURE — 99213 OFFICE O/P EST LOW 20 MIN: CPT | Performed by: FAMILY MEDICINE

## 2018-09-22 RX ORDER — SOLIFENACIN SUCCINATE 10 MG/1
10 TABLET, FILM COATED ORAL DAILY
Refills: 0 | COMMUNITY
Start: 2018-09-12 | End: 2018-12-03

## 2018-09-22 NOTE — PROGRESS NOTES
[facility] [place]  PROGRESS NOTE  Chief Complaint:   Patient presents with:  Bladder Problem  Abdominal Pain: lower left side- started 2 wks ago      HPI:   This is a 48year old male coming in for eval of pain. Pain RLW- under appendix scar. 310 lb 4.8 oz  03/17/18 : (!) 320 lb 9.6 oz  01/17/18 : (!) 309 lb 2 oz  01/09/18 : (!) 307 lb 6.4 oz  12/27/17 : (!) 304 lb 6.4 oz    Past Medical History:  No date: Arthritis  No date: Essential hypertension  2/24/2007: Glucose intolerance  No date: Lul Levothyroxine Sodium 125 MCG Oral Tab TAKE ONE TABLET BY MOUTH ONCE DAILY Disp: 90 tablet Rfl: 2   atorvastatin 10 MG Oral Tab TAKE ONE TABLET BY MOUTH ONCE DAILY IN THE EVENING Disp: 90 tablet Rfl: 2   gabapentin 300 MG Oral Cap Take 300 mg by mouth 3 ( vomiting, constipation, diarrhea, or blood in stool. MUSCULOSKELETAL:  Denies weakness, muscle aches, back pain, joint pain, swelling or stiffness.   NEUROLOGICAL:  Denies headache, seizures, dizziness, syncope, paralysis, ataxia, numbness or tingling in t evidence of hernia. Monitor, pt to return if further issues.       Problem List:  Patient Active Problem List:     Low back pain     Degeneration of lumbar or lumbosacral intervertebral disc     Glucose intolerance     Familial multiple lipoprotein-type hyp

## 2018-09-24 ENCOUNTER — TELEPHONE (OUTPATIENT)
Dept: FAMILY MEDICINE CLINIC | Facility: CLINIC | Age: 53
End: 2018-09-24

## 2018-09-24 NOTE — TELEPHONE ENCOUNTER
----- Message from Cortez Crews MD sent at 9/22/2018  8:40 PM CDT -----  Negative urine, no culture indicated.  Copy to Dr. Emmanuel Arellano and encourage pt to fbee with him

## 2018-10-29 RX ORDER — LEVOTHYROXINE SODIUM 0.12 MG/1
TABLET ORAL
Qty: 90 TABLET | Refills: 0 | Status: SHIPPED | OUTPATIENT
Start: 2018-10-29 | End: 2019-02-14

## 2018-10-29 NOTE — TELEPHONE ENCOUNTER
Future appt:    Last Appointment:  9/22/2018  Pt had diabetic f/u on 8/8/18- at that time pt was instructed to f/u again in 3 months  Levothyroxine refilled last 10/10/17 for #90 with 2 refills  Left detailed message for Deonte Pa- requested that pt call back to schedule his f/u appt in November. Cholesterol, Total (mg/dL)   Date Value   07/07/2018 168     HDL Cholesterol (mg/dL)   Date Value   07/07/2018 35 (L)     LDL Cholesterol (mg/dL)   Date Value   07/07/2018 104     Triglycerides (mg/dL)   Date Value   07/07/2018 144     Lab Results   Component Value Date     (H) 07/07/2018    A1C 6.4 (H) 07/07/2018     Lab Results   Component Value Date    T4F 1.1 10/03/2017    TSH 1.800 07/07/2018       No Follow-up on file.

## 2018-10-30 ENCOUNTER — TELEPHONE (OUTPATIENT)
Dept: FAMILY MEDICINE CLINIC | Facility: CLINIC | Age: 53
End: 2018-10-30

## 2018-10-30 DIAGNOSIS — I10 ESSENTIAL HYPERTENSION: ICD-10-CM

## 2018-10-30 DIAGNOSIS — E74.39 GLUCOSE INTOLERANCE: ICD-10-CM

## 2018-10-30 DIAGNOSIS — E03.9 HYPOTHYROIDISM, UNSPECIFIED TYPE: ICD-10-CM

## 2018-10-30 DIAGNOSIS — L91.8 SKIN TAG: Primary | ICD-10-CM

## 2018-10-30 DIAGNOSIS — E78.49 FAMILIAL MULTIPLE LIPOPROTEIN-TYPE HYPERLIPIDEMIA: ICD-10-CM

## 2018-10-30 DIAGNOSIS — E74.39 GLUCOSE INTOLERANCE: Primary | ICD-10-CM

## 2018-10-30 NOTE — TELEPHONE ENCOUNTER
Pt seen on 8/8/18-   Plan mentioned- that pt can see Dr. Emerson Harvey when ready. Please enter referral.  Thank you.

## 2018-10-30 NOTE — TELEPHONE ENCOUNTER
Future appt:     Your appointments     Date & Time Appointment Department [facility])    Dec 08, 2018  8:15 AM CST Laboratory Visit with REF Chantal Navarrete Reference Lab ([facility] Ref [facility])    Dec 12, 2018  6:15 PM CST Follow up with Nora Lujan MD

## 2018-11-13 RX ORDER — ATORVASTATIN CALCIUM 10 MG/1
TABLET, FILM COATED ORAL
Qty: 30 TABLET | Refills: 2 | Status: SHIPPED | OUTPATIENT
Start: 2018-11-13 | End: 2019-03-26

## 2018-11-13 NOTE — TELEPHONE ENCOUNTER
Future appt: Your appointments     Date & Time Appointment Department [facility])    Nov 26, 2018  4:15 PM CST Sleep Follow Up with [address], DAISY Ellis [address] [facility], [place] Meneses ([facility])    Dec 08, 2018  8:15 AM CST Laboratory Visit with REF Maritza Chapman Reference [facility] ([facility] Ref [facility] Nia Gideon)    Dec 12, 2018  6:15 PM CST Follow up with Oumar Fisher MD [address] [facility], [place]arcus Meneses ([facility])        [address] [facility] [place]  [address] [place] [address]  Nia [place] [id number]  [address] Ref Lab [address]  [phone]        Last Appointment:  9/22/2018    Atorvastatin refilled 10/10/17 for #90 with 2 refills      Cholesterol, Total (mg/dL)   Date Value   07/07/2018 168     HDL Cholesterol (mg/dL)   Date Value   07/07/2018 35 (L)     LDL Cholesterol (mg/dL)   Date Value   07/07/2018 104     Triglycerides (mg/dL)   Date Value   07/07/2018 144     Lab Results   Component Value Date     (H) 07/07/2018    A1C 6.4 (H) 07/07/2018     Lab Results   Component Value Date    T4F 1.1 10/03/2017    TSH 1.800 07/07/2018       No Follow-up on file.

## 2018-12-03 ENCOUNTER — OFFICE VISIT (OUTPATIENT)
Dept: FAMILY MEDICINE CLINIC | Facility: CLINIC | Age: 53
End: 2018-12-03
Payer: COMMERCIAL

## 2018-12-03 VITALS
RESPIRATION RATE: 20 BRPM | HEART RATE: 100 BPM | SYSTOLIC BLOOD PRESSURE: 140 MMHG | BODY MASS INDEX: 44.74 KG/M2 | WEIGHT: 309 LBS | DIASTOLIC BLOOD PRESSURE: 90 MMHG | HEIGHT: 69.5 IN | TEMPERATURE: 98 F

## 2018-12-03 DIAGNOSIS — G47.33 OBSTRUCTIVE SLEEP APNEA (ADULT) (PEDIATRIC): Primary | ICD-10-CM

## 2018-12-03 PROCEDURE — 99213 OFFICE O/P EST LOW 20 MIN: CPT | Performed by: NURSE PRACTITIONER

## 2018-12-03 NOTE — PATIENT INSTRUCTIONS
Recheck in 6 months - sooner if needed. Continue using sleep therapy. Order for supplies will be sent to [facility] today.      Warned if still with sleep apnea and not using CPAP has 7 fold increased risk and heart attack, stroke, abnormal heart rhythm, a

## 2018-12-03 NOTE — PROGRESS NOTES
[facility] [place] [facility]  SLEEP [place]GRESS [place]        HPI:   This is a 48year old male coming in for Patient presents with:  Obstructive Sleep Apnea (KANE): Sleep compliance f/u      HPI:     Has been using CPAP for the past 3 years.  Has not been using Arrhythmia Mother      Allergies:  No Known Allergies  Current Meds:    Current Outpatient Medications:  atorvastatin 10 MG Oral Tab TAKE ONE TABLET BY MOUTH IN THE EVENING Disp: 30 tablet Rfl: 2   LEVOTHYROXINE SODIUM 125 MCG Oral Tab TAKE ONE TABLET BY M Radicular pain of both lower extremities     Sacral pain     Benign prostatic hyperplasia     Skin tag      REVIEW OF SYSTEMS:   Review of Systems   Constitutional: Positive for fatigue. HENT: Negative. Eyes: Negative. Respiratory: Negative.     Car affect. His behavior is normal. Judgment and thought content normal.   Nursing note and vitals reviewed. ASSESSMENT AND PLAN:   1. Obstructive sleep apnea (adult) (pediatric)      Patient Instructions   Recheck in 6 months - sooner if needed.    Contin

## 2019-02-08 ENCOUNTER — LABORATORY ENCOUNTER (OUTPATIENT)
Dept: LAB | Age: 54
End: 2019-02-08
Attending: FAMILY MEDICINE
Payer: COMMERCIAL

## 2019-02-08 DIAGNOSIS — I10 ESSENTIAL HYPERTENSION: ICD-10-CM

## 2019-02-08 DIAGNOSIS — R68.89 ABNORMAL DIGITAL RECTAL EXAM: ICD-10-CM

## 2019-02-08 DIAGNOSIS — E03.9 HYPOTHYROIDISM, UNSPECIFIED TYPE: ICD-10-CM

## 2019-02-08 DIAGNOSIS — E74.39 GLUCOSE INTOLERANCE: ICD-10-CM

## 2019-02-08 DIAGNOSIS — E78.49 FAMILIAL MULTIPLE LIPOPROTEIN-TYPE HYPERLIPIDEMIA: ICD-10-CM

## 2019-02-08 LAB
ALBUMIN SERPL-MCNC: 3.9 G/DL (ref 3.1–4.5)
ALBUMIN/GLOB SERPL: 1.1 {RATIO} (ref 1–2)
ALP LIVER SERPL-CCNC: 45 U/L (ref 45–117)
ALT SERPL-CCNC: 64 U/L (ref 17–63)
ANION GAP SERPL CALC-SCNC: 2 MMOL/L (ref 0–18)
AST SERPL-CCNC: 30 U/L (ref 15–41)
BASOPHILS # BLD AUTO: 0.05 X10(3) UL (ref 0–0.2)
BASOPHILS NFR BLD AUTO: 0.7 %
BILIRUB SERPL-MCNC: 0.4 MG/DL (ref 0.1–2)
BILIRUB UR QL STRIP.AUTO: NEGATIVE
BUN BLD-MCNC: 13 MG/DL (ref 8–20)
BUN/CREAT SERPL: 14.1 (ref 10–20)
CALCIUM BLD-MCNC: 8.4 MG/DL (ref 8.3–10.3)
CHLORIDE SERPL-SCNC: 106 MMOL/L (ref 101–111)
CHOLEST SMN-MCNC: 159 MG/DL (ref ?–200)
CLARITY UR REFRACT.AUTO: CLEAR
CO2 SERPL-SCNC: 32 MMOL/L (ref 22–32)
COLOR UR AUTO: YELLOW
CREAT BLD-MCNC: 0.92 MG/DL (ref 0.7–1.3)
DEPRECATED RDW RBC AUTO: 43.7 FL (ref 35.1–46.3)
EOSINOPHIL # BLD AUTO: 0.37 X10(3) UL (ref 0–0.7)
EOSINOPHIL NFR BLD AUTO: 4.8 %
ERYTHROCYTE [DISTWIDTH] IN BLOOD BY AUTOMATED COUNT: 12.6 % (ref 11–15)
EST. AVERAGE GLUCOSE BLD GHB EST-MCNC: 143 MG/DL (ref 68–126)
GLOBULIN PLAS-MCNC: 3.4 G/DL (ref 2.8–4.4)
GLUCOSE BLD-MCNC: 132 MG/DL (ref 70–99)
GLUCOSE UR STRIP.AUTO-MCNC: NEGATIVE MG/DL
HBA1C MFR BLD HPLC: 6.6 % (ref ?–5.7)
HCT VFR BLD AUTO: 45.9 % (ref 39–53)
HDLC SERPL-MCNC: 38 MG/DL (ref 40–59)
HGB BLD-MCNC: 15.5 G/DL (ref 13–17.5)
IMM GRANULOCYTES # BLD AUTO: 0.04 X10(3) UL (ref 0–1)
IMM GRANULOCYTES NFR BLD: 0.5 %
KETONES UR STRIP.AUTO-MCNC: NEGATIVE MG/DL
LDLC SERPL CALC-MCNC: 100 MG/DL (ref ?–100)
LEUKOCYTE ESTERASE UR QL STRIP.AUTO: NEGATIVE
LYMPHOCYTES # BLD AUTO: 1.9 X10(3) UL (ref 1–4)
LYMPHOCYTES NFR BLD AUTO: 24.8 %
M PROTEIN MFR SERPL ELPH: 7.3 G/DL (ref 6.4–8.2)
MCH RBC QN AUTO: 31.9 PG (ref 26–34)
MCHC RBC AUTO-ENTMCNC: 33.8 G/DL (ref 31–37)
MCV RBC AUTO: 94.4 FL (ref 80–100)
MONOCYTES # BLD AUTO: 0.62 X10(3) UL (ref 0.1–1)
MONOCYTES NFR BLD AUTO: 8.1 %
NEUTROPHILS # BLD AUTO: 4.67 X10 (3) UL (ref 1.5–7.7)
NEUTROPHILS # BLD AUTO: 4.67 X10(3) UL (ref 1.5–7.7)
NEUTROPHILS NFR BLD AUTO: 61.1 %
NITRITE UR QL STRIP.AUTO: NEGATIVE
NONHDLC SERPL-MCNC: 121 MG/DL (ref ?–130)
OSMOLALITY SERPL CALC.SUM OF ELEC: 292 MOSM/KG (ref 275–295)
PH UR STRIP.AUTO: 6 [PH] (ref 4.5–8)
PLATELET # BLD AUTO: 202 10(3)UL (ref 150–450)
POTASSIUM SERPL-SCNC: 4 MMOL/L (ref 3.6–5.1)
PROT UR STRIP.AUTO-MCNC: NEGATIVE MG/DL
PSA SERPL-MCNC: 0.5 NG/ML (ref 0.01–4)
RBC # BLD AUTO: 4.86 X10(6)UL (ref 4.3–5.7)
RBC UR QL AUTO: NEGATIVE
SODIUM SERPL-SCNC: 140 MMOL/L (ref 136–144)
SP GR UR STRIP.AUTO: >=1.03 (ref 1–1.03)
T4 FREE SERPL-MCNC: 0.9 NG/DL (ref 0.9–1.8)
TRIGL SERPL-MCNC: 106 MG/DL (ref 30–149)
TSI SER-ACNC: 4.94 MIU/ML (ref 0.35–5.5)
UROBILINOGEN UR STRIP.AUTO-MCNC: 0.2 MG/DL
VLDLC SERPL CALC-MCNC: 21 MG/DL (ref 0–30)
WBC # BLD AUTO: 7.7 X10(3) UL (ref 4–11)

## 2019-02-08 PROCEDURE — 81003 URINALYSIS AUTO W/O SCOPE: CPT | Performed by: FAMILY MEDICINE

## 2019-02-08 PROCEDURE — 80061 LIPID PANEL: CPT | Performed by: FAMILY MEDICINE

## 2019-02-08 PROCEDURE — 84439 ASSAY OF FREE THYROXINE: CPT | Performed by: FAMILY MEDICINE

## 2019-02-08 PROCEDURE — 36415 COLL VENOUS BLD VENIPUNCTURE: CPT | Performed by: FAMILY MEDICINE

## 2019-02-08 PROCEDURE — 80050 GENERAL HEALTH PANEL: CPT | Performed by: FAMILY MEDICINE

## 2019-02-08 PROCEDURE — 83036 HEMOGLOBIN GLYCOSYLATED A1C: CPT | Performed by: FAMILY MEDICINE

## 2019-02-09 ENCOUNTER — TELEPHONE (OUTPATIENT)
Dept: FAMILY MEDICINE CLINIC | Facility: CLINIC | Age: 54
End: 2019-02-09

## 2019-02-09 NOTE — TELEPHONE ENCOUNTER
----- Message from Jean Yeung MD sent at 2/8/2019  7:04 PM CST -----  Reviewed lab results. Increased HGBA! C, otherwise stable   Pt may need these sent to another provider as he does not have appt with me for a month

## 2019-02-09 NOTE — TELEPHONE ENCOUNTER
Informed pt of his blood work results. Faxed psa to Dr. Maninder Mata office. Pt has an appt in one month and will discuss at next visit in one month. Pt expressed understanding and thanks.

## 2019-02-15 RX ORDER — LOSARTAN POTASSIUM AND HYDROCHLOROTHIAZIDE 25; 100 MG/1; MG/1
TABLET ORAL
Qty: 90 TABLET | Refills: 0 | Status: SHIPPED | OUTPATIENT
Start: 2019-02-15 | End: 2019-03-23

## 2019-02-15 RX ORDER — LEVOTHYROXINE SODIUM 0.12 MG/1
TABLET ORAL
Qty: 90 TABLET | Refills: 0 | Status: SHIPPED | OUTPATIENT
Start: 2019-02-15 | End: 2019-05-13

## 2019-02-15 NOTE — TELEPHONE ENCOUNTER
Future appt:     Your appointments     Date & Time Appointment Department [facility])    Mar 13, 2019  6:00 PM CDT Follow up with Calvin Lo MD [facility], [facility]        [address], [address]

## 2019-03-05 ENCOUNTER — OFFICE VISIT (OUTPATIENT)
Dept: FAMILY MEDICINE CLINIC | Facility: CLINIC | Age: 54
End: 2019-03-05
Payer: COMMERCIAL

## 2019-03-05 ENCOUNTER — APPOINTMENT (OUTPATIENT)
Dept: LAB | Age: 54
End: 2019-03-05
Attending: FAMILY MEDICINE
Payer: COMMERCIAL

## 2019-03-05 VITALS
HEIGHT: 69.5 IN | TEMPERATURE: 97 F | HEART RATE: 82 BPM | DIASTOLIC BLOOD PRESSURE: 76 MMHG | BODY MASS INDEX: 45 KG/M2 | OXYGEN SATURATION: 94 % | SYSTOLIC BLOOD PRESSURE: 130 MMHG | WEIGHT: 310.81 LBS | RESPIRATION RATE: 16 BRPM

## 2019-03-05 DIAGNOSIS — N32.89: ICD-10-CM

## 2019-03-05 DIAGNOSIS — E11.9 NEW ONSET TYPE 2 DIABETES MELLITUS (HCC): ICD-10-CM

## 2019-03-05 DIAGNOSIS — E66.01 CLASS 3 SEVERE OBESITY DUE TO EXCESS CALORIES WITH SERIOUS COMORBIDITY AND BODY MASS INDEX (BMI) OF 45.0 TO 49.9 IN ADULT (HCC): ICD-10-CM

## 2019-03-05 DIAGNOSIS — Z01.818 PREOP EXAMINATION: Primary | ICD-10-CM

## 2019-03-05 DIAGNOSIS — N40.0 BENIGN PROSTATIC HYPERPLASIA, UNSPECIFIED WHETHER LOWER URINARY TRACT SYMPTOMS PRESENT: ICD-10-CM

## 2019-03-05 DIAGNOSIS — D49.4 BLADDER TUMOR: ICD-10-CM

## 2019-03-05 DIAGNOSIS — I10 ESSENTIAL HYPERTENSION: ICD-10-CM

## 2019-03-05 LAB
BASOPHILS # BLD AUTO: 0.04 X10(3) UL (ref 0–0.2)
BASOPHILS NFR BLD AUTO: 0.5 %
DEPRECATED RDW RBC AUTO: 41.1 FL (ref 35.1–46.3)
EOSINOPHIL # BLD AUTO: 0.33 X10(3) UL (ref 0–0.7)
EOSINOPHIL NFR BLD AUTO: 4.3 %
ERYTHROCYTE [DISTWIDTH] IN BLOOD BY AUTOMATED COUNT: 12.5 % (ref 11–15)
HCT VFR BLD AUTO: 44.3 % (ref 39–53)
HGB BLD-MCNC: 15 G/DL (ref 13–17.5)
IMM GRANULOCYTES # BLD AUTO: 0.02 X10(3) UL (ref 0–1)
IMM GRANULOCYTES NFR BLD: 0.3 %
LYMPHOCYTES # BLD AUTO: 2.21 X10(3) UL (ref 1–4)
LYMPHOCYTES NFR BLD AUTO: 28.9 %
MCH RBC QN AUTO: 31.1 PG (ref 26–34)
MCHC RBC AUTO-ENTMCNC: 33.9 G/DL (ref 31–37)
MCV RBC AUTO: 91.9 FL (ref 80–100)
MONOCYTES # BLD AUTO: 0.59 X10(3) UL (ref 0.1–1)
MONOCYTES NFR BLD AUTO: 7.7 %
NEUTROPHILS # BLD AUTO: 4.45 X10 (3) UL (ref 1.5–7.7)
NEUTROPHILS # BLD AUTO: 4.45 X10(3) UL (ref 1.5–7.7)
NEUTROPHILS NFR BLD AUTO: 58.3 %
PLATELET # BLD AUTO: 198 10(3)UL (ref 150–450)
RBC # BLD AUTO: 4.82 X10(6)UL (ref 4.3–5.7)
WBC # BLD AUTO: 7.6 X10(3) UL (ref 4–11)

## 2019-03-05 PROCEDURE — 99215 OFFICE O/P EST HI 40 MIN: CPT | Performed by: FAMILY MEDICINE

## 2019-03-05 PROCEDURE — 93000 ELECTROCARDIOGRAM COMPLETE: CPT | Performed by: FAMILY MEDICINE

## 2019-03-05 PROCEDURE — 36415 COLL VENOUS BLD VENIPUNCTURE: CPT | Performed by: UROLOGY

## 2019-03-05 PROCEDURE — 85025 COMPLETE CBC W/AUTO DIFF WBC: CPT | Performed by: UROLOGY

## 2019-03-05 RX ORDER — SOLIFENACIN SUCCINATE 10 MG/1
1 TABLET, FILM COATED ORAL DAILY
Refills: 3 | COMMUNITY
Start: 2019-03-01 | End: 2020-01-13

## 2019-03-05 NOTE — PROGRESS NOTES
[facility] [place]  PROGRESS NOTE  Chief Complaint:   Patient presents with:  Pre-Op Exam      HPI:   This is a 48year old male coming in for  HPI preoperative medical clearance.   Patient planning to have procedure done with Dr. Sarah Vogel for several 0.9 - 1.8 ng/dL    TSH 4.940 0.350 - 5.500 mIU/mL   URINALYSIS WITH CULTURE REFLEX   Result Value Ref Range    Urine Color Yellow Yellow    Clarity Urine Clear Clear    Spec Gravity >=1.030 1.001 - 1.030    Glucose Urine Negative Negative mg/dl    Bilirubi 2/24/2007   • Obesity    • Sleep apnea      Past Surgical History:   Procedure Laterality Date   • APPENDECTOMY     • CHOLECYSTECTOMY     • TONSILLECTOMY       Social History:  Social History    Socioeconomic History      Marital status:       Spous lancet by Finger stick route daily. Use as directed. Disp: 100 Box Rfl: 1   Albuterol Sulfate HFA (PROAIR HFA) 108 (90 Base) MCG/ACT Inhalation Aero Soln Inhale 2 puffs into the lungs every 4 (four) hours as needed.  Disp: 18 g Rfl: 0   albuterol sulfate (2 sneezing, hives, eczema or rhinitis.      EXAM:   /76 (BP Location: Left arm, Patient Position: Sitting, Cuff Size: large)   Pulse 82   Temp 97.4 °F (36.3 °C) (Temporal)   Resp 16   Ht 69.5\"   Wt (!) 310 lb 12.8 oz   SpO2 94%   BMI 45.24 kg/m²  Estim symptoms present  Unchanged    5. Hypertrophy of bladder  Continued evaluation with Dr. enriquez    6.  Class 3 severe obesity due to excess calories with serious comorbidity and body mass index (BMI) of 45.0 to 49.9 in adult [facility])  Discussed with patient verito

## 2019-03-05 NOTE — H&P
[facility] [place] [place]    History and Physical    Willim Copa Patient Status:  No patient class for patient encounter    1965 MRN [id number]   Location [address]  Attending No att. provider 02/08/2019    CREATSERUM 0.92 02/08/2019    BUN 13 02/08/2019     02/08/2019    K 4.0 02/08/2019     02/08/2019    CO2 32.0 02/08/2019     (H) 02/08/2019    CA 8.4 02/08/2019    ALB 3.9 02/08/2019    ALKPHO 45 02/08/2019    BILT 0.4 02/0

## 2019-03-05 NOTE — PATIENT INSTRUCTIONS
D/w pt diabetes  Do not take metformin  Pt should monitor glucose and f.u 1-2 month post op  Recheck hgb a1c May    Cbc today    EKG- Stable

## 2019-03-07 ENCOUNTER — TELEPHONE (OUTPATIENT)
Dept: FAMILY MEDICINE CLINIC | Facility: CLINIC | Age: 54
End: 2019-03-07

## 2019-03-07 NOTE — TELEPHONE ENCOUNTER
Office note or labs did not say cleared for surgery. [facility].

## 2019-03-08 NOTE — TELEPHONE ENCOUNTER
Please look at CBC to clear pt for surgery on 3/12. Dr. Kayden Christine is out of the office until 3/12. Dasia has EKG and office note.

## 2019-03-11 ENCOUNTER — TELEPHONE (OUTPATIENT)
Dept: FAMILY MEDICINE CLINIC | Facility: CLINIC | Age: 54
End: 2019-03-11

## 2019-03-11 NOTE — PROGRESS NOTES
Patients preop cbc reviewed, and is normal. Pt with controled diabetes. Pt with stable, unchanged EKG. Pt with acceptable medical risk to proceed with urologic procedure with DR. Berrios. Pt encouraged to monitor diet and glucose post operatively.

## 2019-03-11 NOTE — TELEPHONE ENCOUNTER
Addendum added to original preop note. \"Patients preop cbc reviewed, and is normal. Pt with controled diabetes. Pt with stable, unchanged EKG. Pt with acceptable medical risk to proceed with urologic procedure with DR. Berrios.   Pt encouraged to monitor diet

## 2019-03-11 NOTE — TELEPHONE ENCOUNTER
needs clearance for surgery by 3 today otherwise needs to rs surgery. Robert Heather [phone].  Ext [phone]

## 2019-03-23 ENCOUNTER — TELEPHONE (OUTPATIENT)
Dept: FAMILY MEDICINE CLINIC | Facility: CLINIC | Age: 54
End: 2019-03-23

## 2019-03-23 RX ORDER — HYDROCHLOROTHIAZIDE 25 MG/1
25 TABLET ORAL DAILY
Qty: 90 TABLET | Refills: 1 | Status: SHIPPED | OUTPATIENT
Start: 2019-03-23 | End: 2019-10-23 | Stop reason: DRUGHIGH

## 2019-03-23 RX ORDER — LOSARTAN POTASSIUM 100 MG/1
100 TABLET ORAL DAILY
Qty: 90 TABLET | Refills: 1 | Status: SHIPPED | OUTPATIENT
Start: 2019-03-23 | End: 2019-09-20

## 2019-03-23 NOTE — TELEPHONE ENCOUNTER
Received a fax from [employer] stating that Hyxaar is not available. They would like to have an order for Losartan 100mg and HCTZ 25mg. Please advise.

## 2019-03-26 RX ORDER — ATORVASTATIN CALCIUM 10 MG/1
TABLET, FILM COATED ORAL
Qty: 90 TABLET | Refills: 2 | Status: SHIPPED | OUTPATIENT
Start: 2019-03-26 | End: 2019-12-18

## 2019-03-26 NOTE — TELEPHONE ENCOUNTER
Please advise refill of Atorvastatin 10mg. Last Rx: 11/13/18    Future appt:    Last Appointment:  8/18/18 for cholesterol. Has been seen 3/5/19 for acute issue. Cholesterol, Total (mg/dL)   Date Value   02/08/2019 159     HDL Cholesterol (mg/dL)   Date Value   02/08/2019 38 (L)     LDL Cholesterol (mg/dL)   Date Value   02/08/2019 100 (H)     Triglycerides (mg/dL)   Date Value   02/08/2019 106     Lab Results   Component Value Date     (H) 02/08/2019    A1C 6.6 (H) 02/08/2019     Lab Results   Component Value Date    T4F 0.9 02/08/2019    TSH 4.940 02/08/2019       No Follow-up on file.

## 2019-05-01 ENCOUNTER — OFFICE VISIT (OUTPATIENT)
Dept: FAMILY MEDICINE CLINIC | Facility: CLINIC | Age: 54
End: 2019-05-01
Payer: COMMERCIAL

## 2019-05-01 VITALS
TEMPERATURE: 99 F | RESPIRATION RATE: 20 BRPM | WEIGHT: 312.38 LBS | SYSTOLIC BLOOD PRESSURE: 138 MMHG | DIASTOLIC BLOOD PRESSURE: 76 MMHG | HEART RATE: 72 BPM | OXYGEN SATURATION: 94 % | BODY MASS INDEX: 44.72 KG/M2 | HEIGHT: 70 IN

## 2019-05-01 DIAGNOSIS — R10.31 RIGHT LOWER QUADRANT ABDOMINAL PAIN: Primary | ICD-10-CM

## 2019-05-01 DIAGNOSIS — D49.4 BLADDER TUMOR: ICD-10-CM

## 2019-05-01 DIAGNOSIS — I10 ESSENTIAL HYPERTENSION: ICD-10-CM

## 2019-05-01 DIAGNOSIS — E11.9 TYPE 2 DIABETES MELLITUS WITHOUT COMPLICATION, WITHOUT LONG-TERM CURRENT USE OF INSULIN (HCC): ICD-10-CM

## 2019-05-01 PROCEDURE — 99214 OFFICE O/P EST MOD 30 MIN: CPT | Performed by: FAMILY MEDICINE

## 2019-05-01 NOTE — PATIENT INSTRUCTIONS
rec start metformin- at least once a day for help with glucose and weight loss-- consider starting after next urology procedure    Recheck hgba1-- f.u pending result    Surgical consult-- abd pain, due for colonoscopy    Continue urology care

## 2019-05-01 NOTE — PROGRESS NOTES
[facility] [place]  PROGRESS NOTE  Chief Complaint:   Patient presents with:  Diabetes      HPI:   This is a 48year old male [facility] medical f.u     Sees Dr. Hernandez Lights 4/21 bladder procedure- to remove stent, neg ultrasound of testicle.    [facility] 2/24/2007     Past Surgical History:   Procedure Laterality Date   • APPENDECTOMY     • CHOLECYSTECTOMY     • TONSILLECTOMY       Social History:  Social History    Tobacco Use      Smoking status: Former Smoker        Packs/day: 0.25 Disp: 60 tablet Rfl: 1   Albuterol Sulfate HFA (PROAIR HFA) 108 (90 Base) MCG/ACT Inhalation Aero Soln Inhale 2 puffs into the lungs every 4 (four) hours as needed.  Disp: 18 g Rfl: 0   albuterol sulfate (2.5 MG/3ML) 0.083% Inhalation Nebu Soln Take 3 mL (2 cyanosis, no clubbing, FROM, 2+ dorsalis pedis pulses bilaterally. ABDOMEN: Soft, nondistended,no Masses, no hepatosplenomegaly. Tender right lower quadrant there is a large surgical scar here there is no defect palpable.   Patient complains of mild disco lipoprotein-type hyperlipidemia     Essential hypertension     Hypothyroidism     Class 3 severe obesity due to excess calories with serious comorbidity and body mass index (BMI) of 45.0 to 49.9 in adult [facility])     Tobacco use disorder     Obstructive sleep

## 2019-05-13 RX ORDER — LEVOTHYROXINE SODIUM 0.12 MG/1
TABLET ORAL
Qty: 90 TABLET | Refills: 3 | Status: SHIPPED | OUTPATIENT
Start: 2019-05-13 | End: 2020-06-17

## 2019-05-13 NOTE — TELEPHONE ENCOUNTER
Future appt:    Last Appointment:  5/1/2019    Levothyroxine refilled on 2/15/19 for #90      Cholesterol, Total (mg/dL)   Date Value   02/08/2019 159     HDL Cholesterol (mg/dL)   Date Value   02/08/2019 38 (L)     LDL Cholesterol (mg/dL)   Date Value   02/08/2019 100 (H)     Triglycerides (mg/dL)   Date Value   02/08/2019 106     Lab Results   Component Value Date     (H) 02/08/2019    A1C 6.6 (H) 02/08/2019     Lab Results   Component Value Date    T4F 0.9 02/08/2019    TSH 4.940 02/08/2019       No follow-ups on file.

## 2019-07-12 ENCOUNTER — APPOINTMENT (OUTPATIENT)
Dept: LAB | Age: 54
End: 2019-07-12
Attending: FAMILY MEDICINE
Payer: COMMERCIAL

## 2019-07-12 DIAGNOSIS — E11.9 TYPE 2 DIABETES MELLITUS WITHOUT COMPLICATION, WITHOUT LONG-TERM CURRENT USE OF INSULIN (HCC): ICD-10-CM

## 2019-07-12 LAB
EST. AVERAGE GLUCOSE BLD GHB EST-MCNC: 143 MG/DL (ref 68–126)
HBA1C MFR BLD HPLC: 6.6 % (ref ?–5.7)

## 2019-07-12 PROCEDURE — 36415 COLL VENOUS BLD VENIPUNCTURE: CPT | Performed by: FAMILY MEDICINE

## 2019-07-12 PROCEDURE — 83036 HEMOGLOBIN GLYCOSYLATED A1C: CPT | Performed by: FAMILY MEDICINE

## 2019-09-20 RX ORDER — LOSARTAN POTASSIUM 100 MG/1
TABLET ORAL
Qty: 90 TABLET | Refills: 1 | Status: SHIPPED | OUTPATIENT
Start: 2019-09-20 | End: 2020-03-16

## 2019-09-20 NOTE — TELEPHONE ENCOUNTER
Future appt:    Last Appointment with provider:   5/1/2019; No f/u recommended     Last appointment at [facility]:  5/1/2019  Cholesterol, Total (mg/dL)   Date Value   02/08/2019 159     HDL Cholesterol (mg/dL)   Date Value   02/08/2019 38 (L)     LDL Ch

## 2019-10-23 ENCOUNTER — LAB ENCOUNTER (OUTPATIENT)
Dept: LAB | Age: 54
End: 2019-10-23
Attending: FAMILY MEDICINE
Payer: COMMERCIAL

## 2019-10-23 ENCOUNTER — OFFICE VISIT (OUTPATIENT)
Dept: FAMILY MEDICINE CLINIC | Facility: CLINIC | Age: 54
End: 2019-10-23
Payer: COMMERCIAL

## 2019-10-23 VITALS
RESPIRATION RATE: 18 BRPM | TEMPERATURE: 98 F | BODY MASS INDEX: 45.61 KG/M2 | DIASTOLIC BLOOD PRESSURE: 64 MMHG | SYSTOLIC BLOOD PRESSURE: 122 MMHG | OXYGEN SATURATION: 98 % | WEIGHT: 315 LBS | HEIGHT: 69.5 IN | HEART RATE: 94 BPM

## 2019-10-23 DIAGNOSIS — R60.0 PEDAL EDEMA: ICD-10-CM

## 2019-10-23 DIAGNOSIS — D49.4 BLADDER TUMOR: ICD-10-CM

## 2019-10-23 DIAGNOSIS — I10 ESSENTIAL HYPERTENSION: Primary | ICD-10-CM

## 2019-10-23 DIAGNOSIS — N32.81 OVERACTIVE BLADDER: ICD-10-CM

## 2019-10-23 DIAGNOSIS — I77.6 VASCULITIS (HCC): ICD-10-CM

## 2019-10-23 PROCEDURE — 99214 OFFICE O/P EST MOD 30 MIN: CPT | Performed by: FAMILY MEDICINE

## 2019-10-23 PROCEDURE — 85025 COMPLETE CBC W/AUTO DIFF WBC: CPT | Performed by: FAMILY MEDICINE

## 2019-10-23 PROCEDURE — 80053 COMPREHEN METABOLIC PANEL: CPT | Performed by: FAMILY MEDICINE

## 2019-10-23 PROCEDURE — 36415 COLL VENOUS BLD VENIPUNCTURE: CPT | Performed by: FAMILY MEDICINE

## 2019-10-23 RX ORDER — FUROSEMIDE 20 MG/1
20 TABLET ORAL 2 TIMES DAILY
Qty: 30 TABLET | Refills: 1 | Status: SHIPPED | OUTPATIENT
Start: 2019-10-23 | End: 2019-11-11

## 2019-10-23 NOTE — PROGRESS NOTES
Nate Blackwell is a 48year old male. HPI:   Patient presents for evaluation of skin rash. Pt with bladder cancer- Dr. Milena Russell treating     Rash 2-3 weeks  Ankle initially , now upto knees.   Rash seems to come and go when it is at its worst it is very itc MG/3ML) 0.083% Inhalation Nebu Soln, Take 3 mL (2.5 mg total) by nebulization every 4 (four) hours as needed. , Disp: 1 Box, Rfl: 0  gabapentin 300 MG Oral Cap, Take 300 mg by mouth daily.  Per Dr. Brianna Corrigan , Disp: , Rfl: 1       Past Medical History:   Diagn interactions    ASSESSMENT AND PLAN:   .   1. Essential hypertension  Blood pressure stable today    2. Pedal edema  Increased edema  - COMP METABOLIC PANEL (14); Future  - CBC WITH DIFFERENTIAL WITH PLATELET;  Future  - URINALYSIS WITH CULTURE REFLEX; Futu

## 2019-10-23 NOTE — PATIENT INSTRUCTIONS
Lab -- cbc and chem panel, ua    rec compression stockings-- Lehan's    Stop HCTZ-- START lasix daily    Recheck 2-3 weeks

## 2019-11-11 ENCOUNTER — OFFICE VISIT (OUTPATIENT)
Dept: FAMILY MEDICINE CLINIC | Facility: CLINIC | Age: 54
End: 2019-11-11
Payer: COMMERCIAL

## 2019-11-11 VITALS
BODY MASS INDEX: 45.61 KG/M2 | OXYGEN SATURATION: 98 % | DIASTOLIC BLOOD PRESSURE: 76 MMHG | WEIGHT: 315 LBS | HEIGHT: 69.5 IN | RESPIRATION RATE: 18 BRPM | TEMPERATURE: 98 F | SYSTOLIC BLOOD PRESSURE: 134 MMHG | HEART RATE: 100 BPM

## 2019-11-11 DIAGNOSIS — R60.0 PEDAL EDEMA: ICD-10-CM

## 2019-11-11 DIAGNOSIS — I10 ESSENTIAL HYPERTENSION: Primary | ICD-10-CM

## 2019-11-11 PROCEDURE — 99213 OFFICE O/P EST LOW 20 MIN: CPT | Performed by: FAMILY MEDICINE

## 2019-11-11 RX ORDER — FUROSEMIDE 20 MG/1
20 TABLET ORAL 2 TIMES DAILY
Qty: 180 TABLET | Refills: 1 | Status: SHIPPED | OUTPATIENT
Start: 2019-11-11 | End: 2020-05-11

## 2019-11-11 NOTE — PROGRESS NOTES
[address]  PROGRESS NOTE  Chief Complaint:   Patient presents with: Follow - Up: Swelling of the feet      HPI:   This is a 48year old male here for medical f.u.     He tolerated change from HCTZ to lasix     doing much better- rash fa ALT 66 (H) 16 - 61 U/L    Alkaline Phosphatase 52 45 - 117 U/L    Bilirubin, Total 0.4 0.1 - 2.0 mg/dL    Total Protein 7.1 6.4 - 8.2 g/dL    Albumin 4.1 3.4 - 5.0 g/dL    Globulin  3.0 2.8 - 4.4 g/dL    A/G Ratio 1.4 1.0 - 2.0   CBC W/ DIFFERENTIAL   R use: No    Social History    Patient does not qualify to have social determinant information on file (likely too young).     Social History Narrative      Not on file    Family History:  Family History   Problem Relation Age of Onset   • Hypertension Father discomfort, palpitations, edema, dyspnea on exertion or at rest.  RESPIRATORY:  Denies shortness of breath, wheezing, cough or sputum. GASTROINTESTINAL:  Denies abdominal pain, nausea, vomiting, constipation, diarrhea, or blood in stool.   MUSCULOSKELETAL: appropriate, no depressed mood or anxiety      ASSESSMENT AND PLAN:   Elie Valente was seen today for follow - up. Diagnoses and all orders for this visit:    Essential hypertension  -     CBC WITH DIFFERENTIAL WITH PLATELET;  Future  -     COMP METABOLIC PANEL mellitus without complication, without long-term current use of insulin (HCC)     Familial multiple lipoprotein-type hyperlipidemia     Essential hypertension     Hypothyroidism     Class 3 severe obesity due to excess calories with serious comorbidity and

## 2019-11-11 NOTE — PATIENT INSTRUCTIONS
Continue potassium in diet    Recheck 3 months with fasting labs and appt to follow    Encourage healthy diet with exercise as back allows      Patient to take lasix and metformin twice a day    rec compression stockings on in AM -- off in PM

## 2019-11-22 ENCOUNTER — TELEPHONE (OUTPATIENT)
Dept: FAMILY MEDICINE CLINIC | Facility: CLINIC | Age: 54
End: 2019-11-22

## 2019-11-27 ENCOUNTER — OFFICE VISIT (OUTPATIENT)
Dept: FAMILY MEDICINE CLINIC | Facility: CLINIC | Age: 54
End: 2019-11-27
Payer: COMMERCIAL

## 2019-11-27 VITALS
WEIGHT: 315 LBS | OXYGEN SATURATION: 98 % | HEIGHT: 69.5 IN | RESPIRATION RATE: 18 BRPM | SYSTOLIC BLOOD PRESSURE: 128 MMHG | DIASTOLIC BLOOD PRESSURE: 82 MMHG | BODY MASS INDEX: 45.61 KG/M2 | TEMPERATURE: 98 F | HEART RATE: 100 BPM

## 2019-11-27 DIAGNOSIS — G47.33 OBSTRUCTIVE SLEEP APNEA (ADULT) (PEDIATRIC): Primary | ICD-10-CM

## 2019-11-27 PROCEDURE — 99214 OFFICE O/P EST MOD 30 MIN: CPT | Performed by: NURSE PRACTITIONER

## 2019-11-27 NOTE — PATIENT INSTRUCTIONS
Continue sleep therapy. Follow-up in July 2020 (can order new machine then) - sooner if needed.      Advised if still with sleep apnea and not using CPAP has a  7 fold increase in risk of heart attack, stroke, abnormal heart rhythm  and death,  increased

## 2019-11-27 NOTE — PROGRESS NOTES
[facility]  SLEEP [place]GRESS [place]        HPI:   This is a 48year old male coming in for Patient presents with:  Obstructive Sleep Apnea (KANE)      HPI:     Patient is present to review his sleep therapy.   States that he is doing well with Social History Narrative      Not on file    Social History    Tobacco Use      Smoking status: Former Smoker        Packs/day: 0.25        Quit date: 2019        Years since quittin.7      Smokeless tobacco: Never Used      Tobacco comment: 5 cig lumbosacral intervertebral disc     Type 2 diabetes mellitus without complication, without long-term current use of insulin (HCC)     Familial multiple lipoprotein-type hyperlipidemia     Essential hypertension     Hypothyroidism     Class 3 severe obesity atraumatic.    Right Ear: Tympanic membrane, external ear and ear canal normal.   Left Ear: Tympanic membrane, external ear and ear canal normal.   Nose: Nose normal.   Mouth/Throat: Oropharynx is clear and moist.   Mal 4, tonsils 0   Eyes: Conjunctivae are increased risk of driving accidents. Advised to refrain from driving when sleepy. COMPLIANCE is required by insurance for 4 hours a night most nights of the week.   Recommend weight loss, and maintain and optimal BMI with Exercise 30 minutes most days

## 2019-12-14 ENCOUNTER — OFFICE VISIT (OUTPATIENT)
Dept: FAMILY MEDICINE CLINIC | Facility: CLINIC | Age: 54
End: 2019-12-14
Payer: COMMERCIAL

## 2019-12-14 VITALS
BODY MASS INDEX: 45.61 KG/M2 | OXYGEN SATURATION: 98 % | RESPIRATION RATE: 18 BRPM | SYSTOLIC BLOOD PRESSURE: 144 MMHG | TEMPERATURE: 98 F | HEART RATE: 92 BPM | WEIGHT: 315 LBS | HEIGHT: 69.5 IN | DIASTOLIC BLOOD PRESSURE: 86 MMHG

## 2019-12-14 DIAGNOSIS — I87.2 VENOUS STASIS DERMATITIS OF BOTH LOWER EXTREMITIES: Primary | ICD-10-CM

## 2019-12-14 DIAGNOSIS — L03.115 CELLULITIS OF RIGHT LEG: ICD-10-CM

## 2019-12-14 DIAGNOSIS — I10 ESSENTIAL HYPERTENSION: ICD-10-CM

## 2019-12-14 PROCEDURE — 99214 OFFICE O/P EST MOD 30 MIN: CPT | Performed by: FAMILY MEDICINE

## 2019-12-14 RX ORDER — CEPHALEXIN 500 MG/1
500 CAPSULE ORAL 3 TIMES DAILY
Qty: 30 CAPSULE | Refills: 0 | Status: SHIPPED | OUTPATIENT
Start: 2019-12-14 | End: 2020-01-13

## 2019-12-14 NOTE — PROGRESS NOTES
Karen Samuels is a 48year old male. Here with complaints of swelling in his legs. HPI:     Patient noting increased swelling in both of his lower legs but increased redness and pain in his right leg.   Has no particular calf tenderness notes increased Inhale 2 puffs into the lungs every 4 (four) hours as needed. 18 g 0   • albuterol sulfate (2.5 MG/3ML) 0.083% Inhalation Nebu Soln Take 3 mL (2.5 mg total) by nebulization every 4 (four) hours as needed.  1 Box 0   • gabapentin 300 MG Oral Cap Take 300 mg Phosphatase 52 45 - 117 U/L    Bilirubin, Total 0.4 0.1 - 2.0 mg/dL    Total Protein 7.1 6.4 - 8.2 g/dL    Albumin 4.1 3.4 - 5.0 g/dL    Globulin  3.0 2.8 - 4.4 g/dL    A/G Ratio 1.4 1.0 - 2.0   CBC W/ DIFFERENTIAL   Result Value Ref Range    WBC 7.5 4.0 - anterior shins most markedly on the right he now has red this with demarcation about two thirds of the way up his anterior shin with some mild warmth and definite tenderness. There is no open skin wounds appreciated.   PSYCH: moods good, normal focus and i

## 2019-12-14 NOTE — PATIENT INSTRUCTIONS
rec lasix--furosamide-- 40mg today, then 40mg 2x tomorrow , then 40mg once a day    ACE wrap to help get fluid out of leg    Keflex -- antibibiotic-- 3x aday for 10 days    Recheck 7-10 day

## 2019-12-17 ENCOUNTER — TELEPHONE (OUTPATIENT)
Dept: FAMILY MEDICINE CLINIC | Facility: CLINIC | Age: 54
End: 2019-12-17

## 2019-12-17 NOTE — TELEPHONE ENCOUNTER
Reviewed and agree.     Future Appointments   Date Time Provider Willem Heather   12/23/2019  3:00 PM Dima Segovia MD [facility] [place] [facility]

## 2019-12-17 NOTE — TELEPHONE ENCOUNTER
Pt feeling better. Swelling has decreased as well as pain. Pt has f/u scheduled next Monday. Pt will continue to monitor and call if needed.       Future Appointments   Date Time Provider Willem Romero   12/23/2019  3:00 PM Chauncey Henry MD [facility]

## 2019-12-17 NOTE — TELEPHONE ENCOUNTER
FYI: Patient was told to call for an update, states that pain is better and swelling on legs is better too.

## 2019-12-18 RX ORDER — ATORVASTATIN CALCIUM 10 MG/1
TABLET, FILM COATED ORAL
Qty: 90 TABLET | Refills: 0 | Status: SHIPPED | OUTPATIENT
Start: 2019-12-18 | End: 2020-04-13

## 2019-12-18 NOTE — TELEPHONE ENCOUNTER
Future appt:     Your appointments     Date & Time Appointment Department [facility])    Dec 23, 2019  3:00 PM CST Follow Up Visit with Alicia Balnk MD [facility], [facility]            [address] [place] [address]

## 2019-12-23 ENCOUNTER — OFFICE VISIT (OUTPATIENT)
Dept: FAMILY MEDICINE CLINIC | Facility: CLINIC | Age: 54
End: 2019-12-23
Payer: COMMERCIAL

## 2019-12-23 VITALS
BODY MASS INDEX: 45.61 KG/M2 | TEMPERATURE: 98 F | WEIGHT: 315 LBS | RESPIRATION RATE: 16 BRPM | HEART RATE: 86 BPM | DIASTOLIC BLOOD PRESSURE: 76 MMHG | OXYGEN SATURATION: 97 % | SYSTOLIC BLOOD PRESSURE: 134 MMHG | HEIGHT: 69.5 IN

## 2019-12-23 DIAGNOSIS — I10 ESSENTIAL HYPERTENSION: Primary | ICD-10-CM

## 2019-12-23 DIAGNOSIS — R60.0 PEDAL EDEMA: ICD-10-CM

## 2019-12-23 PROCEDURE — 99213 OFFICE O/P EST LOW 20 MIN: CPT | Performed by: FAMILY MEDICINE

## 2019-12-23 NOTE — PROGRESS NOTES
CC: Annual Physical Exam    HPI:   Kera Kruse is a 48year old male who presents for recheck of leg swelling, previous treatment for cellulitis- redness barly present, mild sore ness ant shin.  Swelling much decreased with compression stockings    No ca 2. 0   CBC W/ DIFFERENTIAL   Result Value Ref Range    WBC 7.5 4.0 - 11.0 x10(3) uL    RBC 4.66 4.30 - 5.70 x10(6)uL    HGB 14.3 13.0 - 17.5 g/dL    HCT 43.8 39.0 - 53.0 %    .0 150.0 - 450.0 10(3)uL    MCV 94.0 80.0 - 100.0 fL    MCH 30.7 26.0 - 34. gabapentin 300 MG Oral Cap Take 300 mg by mouth daily.  Per Dr. Lorna Mac   1      No Known Allergies   Past Medical History:   Diagnosis Date   • Arthritis    • Bladder tumor 3/5/2019   • Essential hypertension    • Glucose intolerance 2/24/2007   • Hyperlip throat. INTEGUMENTARY:  Denies rashes, itching, skin lesion, or excessive skin dryness.   CARDIOVASCULAR:  Denies chest pain, chest pressure, chest discomfort, palpitations, edema, dyspnea on exertion or at rest.  RESPIRATORY:  Denies shortness of breath, are normal      ASSESSMENT AND PLAN:   Joaquina Dugan is a 48year old male who presents for a complete physical exam.  Pap and pelvic done. Self breast exam explained.    Health maintenance, will check : No orders of the defined types were placed in this

## 2019-12-23 NOTE — PATIENT INSTRUCTIONS
rec finish antibiotics    Compression stocking bilaterally    Ultrasound- venous of right leg--need to go to ED if acute worsening of leg    rec arterial doppler of right lower leg    Trial of walking    Continue care of bladder cancer with urology    F.u

## 2020-01-13 ENCOUNTER — OFFICE VISIT (OUTPATIENT)
Dept: FAMILY MEDICINE CLINIC | Facility: CLINIC | Age: 55
End: 2020-01-13
Payer: COMMERCIAL

## 2020-01-13 VITALS
HEART RATE: 72 BPM | DIASTOLIC BLOOD PRESSURE: 80 MMHG | WEIGHT: 315 LBS | SYSTOLIC BLOOD PRESSURE: 132 MMHG | TEMPERATURE: 97 F | OXYGEN SATURATION: 97 % | RESPIRATION RATE: 16 BRPM | BODY MASS INDEX: 49 KG/M2

## 2020-01-13 DIAGNOSIS — H10.32 ACUTE BACTERIAL CONJUNCTIVITIS OF LEFT EYE: ICD-10-CM

## 2020-01-13 DIAGNOSIS — H65.192 OTHER NON-RECURRENT ACUTE NONSUPPURATIVE OTITIS MEDIA OF LEFT EAR: Primary | ICD-10-CM

## 2020-01-13 DIAGNOSIS — J02.9 SORE THROAT: ICD-10-CM

## 2020-01-13 PROCEDURE — 99213 OFFICE O/P EST LOW 20 MIN: CPT | Performed by: NURSE PRACTITIONER

## 2020-01-13 RX ORDER — CYCLOBENZAPRINE HCL 10 MG
TABLET ORAL
COMMUNITY
Start: 2019-12-31

## 2020-01-13 RX ORDER — AMOXICILLIN AND CLAVULANATE POTASSIUM 875; 125 MG/1; MG/1
1 TABLET, FILM COATED ORAL 2 TIMES DAILY
Qty: 20 TABLET | Refills: 0 | Status: SHIPPED | OUTPATIENT
Start: 2020-01-13 | End: 2020-01-23

## 2020-01-13 RX ORDER — POLYMYXIN B SULFATE AND TRIMETHOPRIM 1; 10000 MG/ML; [USP'U]/ML
1 SOLUTION OPHTHALMIC EVERY 6 HOURS
Qty: 1 BOTTLE | Refills: 0 | Status: SHIPPED | OUTPATIENT
Start: 2020-01-13 | End: 2020-01-20

## 2020-01-13 NOTE — PATIENT INSTRUCTIONS
Left eye infection; 1 drop in left eye every 6 hours for seven days. Refresh eye drops, two drops hourly while awake, may keep in the fridge for cooling effect. Cool wash cloth to left eye for any discomfort.   Wash pillow cases and sheets after 24 hours

## 2020-01-13 NOTE — PROGRESS NOTES
CHIEF COMPLAINT:   Patient presents with:  Ear Pain: L ear pain x3 days  Eye Problem: L eye redness, swelling, and puffiness x2 days  Sore Throat: Scratchy sore throat started this AM      HPI:   Kranthi Boston is a 48year old male who presents to clini Per Dr. Mendel Garza   1   • cyclobenzaprine 10 MG Oral Tab TAKE 1 TABLET BY MOUTH ONCE DAILY AT BEDTIME AS NEEDED (DO NOT DRIVE OR DRINK WHILE ON THIS MEDICATION)     • Albuterol Sulfate HFA (PROAIR HFA) 108 (90 Base) MCG/ACT Inhalation Aero Soln Inhale 2 puff nausea, vomiting, constipation, diarrhea. NEURO: Denies dizziness, numbness, nor tingling in face.  See HPI    EXAM:   /80 (BP Location: Right arm, Patient Position: Sitting)   Pulse 72   Temp 97.4 °F (36.3 °C) (Tympanic)   Resp 16   Wt (!) 339 lb ( refresh eye drops, cool compress. Instructed to wash pillow cases after 24 hours of drops. Frequent handwashing.   To notify opthalamology if development of vision changes or pain in the eye; to notify the clinic if no significant improvement in the next

## 2020-02-25 ENCOUNTER — OFFICE VISIT (OUTPATIENT)
Dept: FAMILY MEDICINE CLINIC | Facility: CLINIC | Age: 55
End: 2020-02-25
Payer: COMMERCIAL

## 2020-02-25 VITALS
HEART RATE: 84 BPM | SYSTOLIC BLOOD PRESSURE: 130 MMHG | RESPIRATION RATE: 16 BRPM | DIASTOLIC BLOOD PRESSURE: 78 MMHG | TEMPERATURE: 97 F | HEIGHT: 69.5 IN | WEIGHT: 315 LBS | BODY MASS INDEX: 45.61 KG/M2

## 2020-02-25 DIAGNOSIS — H04.129 DRY EYE: Primary | ICD-10-CM

## 2020-02-25 PROCEDURE — 99213 OFFICE O/P EST LOW 20 MIN: CPT | Performed by: FAMILY MEDICINE

## 2020-02-25 NOTE — PROGRESS NOTES
CHIEF COMPLAINT:   Patient presents with:  Eye Problem: had pink eye 4 weeks ago got eye drops now c/o eyes wheeping      HPI:   Neris Gant is a 50year old male who presents to clinic today with complaints of increase   right eye conjunctiivity 1/13 complication, without long-term current use [facility]      Social History:  Social History    Socioeconomic History      Marital status:       Spouse name: Not on file      Number of children: 2      Years of education: Not on file problems symptoms are consistent with    ASSESSMENT:  Dry eye  (primary encounter diagnosis)    PLAN: Meds as listed below.   Comfort measures as described in Patient Instructions  Patient Instructions   rec blinking exercises-- 2-3 x a day for a few days

## 2020-02-25 NOTE — PATIENT INSTRUCTIONS
rec blinking exercises-- 2-3 x a day for a few days    rec lubricating drops to both eyes  2-4 x a day for a week , then as needed

## 2020-03-16 RX ORDER — LOSARTAN POTASSIUM 100 MG/1
TABLET ORAL
Qty: 90 TABLET | Refills: 1 | Status: SHIPPED | OUTPATIENT
Start: 2020-03-16 | End: 2020-08-18

## 2020-03-16 NOTE — TELEPHONE ENCOUNTER
Future appt:    Last Appointment with provider:   2/25/2020  Last appointment at [facility]:  2/25/2020      Losartan refilled 9/20/19 for 6 month supply    Cholesterol, Total (mg/dL)   Date Value   02/08/2019 159     HDL Cholesterol (mg/dL)   Date Value   02/08/2019 38 (L)     LDL Cholesterol (mg/dL)   Date Value   02/08/2019 100 (H)     Triglycerides (mg/dL)   Date Value   02/08/2019 106     Lab Results   Component Value Date     (H) 07/12/2019    A1C 6.6 (H) 07/12/2019     Lab Results   Component Value Date    T4F 0.9 02/08/2019    TSH 4.940 02/08/2019       No follow-ups on file.

## 2020-03-20 ENCOUNTER — TELEPHONE (OUTPATIENT)
Dept: FAMILY MEDICINE CLINIC | Facility: CLINIC | Age: 55
End: 2020-03-20

## 2020-03-20 NOTE — TELEPHONE ENCOUNTER
Future appt:    Last Appointment with provider:   2/25/2020  Last appointment at [facility]:  2/25/2020    Metformin refilled on 11/11/19 for 6 month supply    Cholesterol, Total (mg/dL)   Date Value   02/08/2019 159     HDL Cholesterol (mg/dL)   Date [place]

## 2020-04-13 RX ORDER — ATORVASTATIN CALCIUM 10 MG/1
TABLET, FILM COATED ORAL
Qty: 90 TABLET | Refills: 0 | Status: SHIPPED | OUTPATIENT
Start: 2020-04-13 | End: 2020-08-18

## 2020-04-13 NOTE — TELEPHONE ENCOUNTER
Future appt:    Last Appointment with provider:   2/25/2020  Last appointment at [facility]:  2/25/2020  Last px:  10/10/17    Atorvastatin refilled on 12/18/19 for #90    Left detailed message for pt to call back to schedule appt for px this summer.

## 2020-05-11 ENCOUNTER — TELEPHONE (OUTPATIENT)
Dept: FAMILY MEDICINE CLINIC | Facility: CLINIC | Age: 55
End: 2020-05-11

## 2020-05-11 RX ORDER — FUROSEMIDE 20 MG/1
TABLET ORAL
Qty: 180 TABLET | Refills: 0 | Status: SHIPPED | OUTPATIENT
Start: 2020-05-11 | End: 2020-08-11

## 2020-05-11 NOTE — TELEPHONE ENCOUNTER
Patient called in stating he spoke to [facility] regarding setting up Lab appointments and a follow up with Dr. Markel Triplett    Patient is now on the schedule for both appts in June FYI

## 2020-05-11 NOTE — TELEPHONE ENCOUNTER
Appt's on file noted.     Future Appointments   Date Time Provider Willem Heather   6/16/2020  9:45 AM REF [place] [facility]   6/24/2020 10:45 AM Garett Shepard MD [facility] [place] [facility] [place]

## 2020-05-11 NOTE — TELEPHONE ENCOUNTER
Future appt:    Last Appointment with provider:   2/25/2020  Last appointment at [facility]:  2/25/2020    Furosemide refilled on 11/11/19 for 6 month supply    Pt due for px,  Lab orders also noted on file.   Asked pt to call back to schedule appt    Cho

## 2020-06-15 RX ORDER — FUROSEMIDE 20 MG/1
TABLET ORAL
Qty: 180 TABLET | Refills: 0 | OUTPATIENT
Start: 2020-06-15

## 2020-06-17 RX ORDER — LEVOTHYROXINE SODIUM 0.12 MG/1
TABLET ORAL
Qty: 90 TABLET | Refills: 0 | Status: SHIPPED | OUTPATIENT
Start: 2020-06-17 | End: 2020-08-18

## 2020-06-17 NOTE — TELEPHONE ENCOUNTER
Future appt:     Your appointments     Date & Time Appointment Department [facility])    Jul 30, 2020  8:00 AM CDT Laboratory Visit with REF [facility] ([facility])        Aug 07, 2020  1:30 PM CDT Follow Up Visit with Laya Arreola

## 2020-08-11 RX ORDER — FUROSEMIDE 20 MG/1
TABLET ORAL
Qty: 180 TABLET | Refills: 0 | Status: SHIPPED | OUTPATIENT
Start: 2020-08-11 | End: 2020-08-18

## 2020-08-11 NOTE — TELEPHONE ENCOUNTER
Last Refill:    FUROSEMIDE 20 MG Oral Tab 180 tablet 0 5/11/2020     LOV - 2/25/20 Eye Problem  NOV - 8/18/20 Cas [place], [place]     Future appt:     Your appointments     Date & Time Appointment Department [facility])    Aug 13

## 2020-08-13 ENCOUNTER — LABORATORY ENCOUNTER (OUTPATIENT)
Dept: LAB | Age: 55
End: 2020-08-13
Attending: FAMILY MEDICINE
Payer: COMMERCIAL

## 2020-08-13 DIAGNOSIS — R60.0 PEDAL EDEMA: ICD-10-CM

## 2020-08-13 DIAGNOSIS — I10 ESSENTIAL HYPERTENSION: ICD-10-CM

## 2020-08-13 LAB
ALBUMIN SERPL-MCNC: 3.7 G/DL (ref 3.4–5)
ALBUMIN/GLOB SERPL: 1.2 {RATIO} (ref 1–2)
ALP LIVER SERPL-CCNC: 54 U/L (ref 45–117)
ALT SERPL-CCNC: 60 U/L (ref 16–61)
ANION GAP SERPL CALC-SCNC: 3 MMOL/L (ref 0–18)
AST SERPL-CCNC: 24 U/L (ref 15–37)
BASOPHILS # BLD AUTO: 0.05 X10(3) UL (ref 0–0.2)
BASOPHILS NFR BLD AUTO: 0.6 %
BILIRUB SERPL-MCNC: 0.5 MG/DL (ref 0.1–2)
BUN BLD-MCNC: 10 MG/DL (ref 7–18)
BUN/CREAT SERPL: 11.9 (ref 10–20)
CALCIUM BLD-MCNC: 8.5 MG/DL (ref 8.5–10.1)
CHLORIDE SERPL-SCNC: 104 MMOL/L (ref 98–112)
CHOLEST SMN-MCNC: 156 MG/DL (ref ?–200)
CO2 SERPL-SCNC: 31 MMOL/L (ref 21–32)
CREAT BLD-MCNC: 0.84 MG/DL (ref 0.7–1.3)
DEPRECATED RDW RBC AUTO: 44.2 FL (ref 35.1–46.3)
EOSINOPHIL # BLD AUTO: 0.29 X10(3) UL (ref 0–0.7)
EOSINOPHIL NFR BLD AUTO: 3.6 %
ERYTHROCYTE [DISTWIDTH] IN BLOOD BY AUTOMATED COUNT: 12.6 % (ref 11–15)
EST. AVERAGE GLUCOSE BLD GHB EST-MCNC: 146 MG/DL (ref 68–126)
GLOBULIN PLAS-MCNC: 3.2 G/DL (ref 2.8–4.4)
GLUCOSE BLD-MCNC: 108 MG/DL (ref 70–99)
HBA1C MFR BLD HPLC: 6.7 % (ref ?–5.7)
HCT VFR BLD AUTO: 41.8 % (ref 39–53)
HDLC SERPL-MCNC: 32 MG/DL (ref 40–59)
HGB BLD-MCNC: 13.3 G/DL (ref 13–17.5)
IMM GRANULOCYTES # BLD AUTO: 0.01 X10(3) UL (ref 0–1)
IMM GRANULOCYTES NFR BLD: 0.1 %
LDLC SERPL CALC-MCNC: 94 MG/DL (ref ?–100)
LYMPHOCYTES # BLD AUTO: 2.83 X10(3) UL (ref 1–4)
LYMPHOCYTES NFR BLD AUTO: 35.6 %
M PROTEIN MFR SERPL ELPH: 6.9 G/DL (ref 6.4–8.2)
MCH RBC QN AUTO: 30.4 PG (ref 26–34)
MCHC RBC AUTO-ENTMCNC: 31.8 G/DL (ref 31–37)
MCV RBC AUTO: 95.4 FL (ref 80–100)
MONOCYTES # BLD AUTO: 0.64 X10(3) UL (ref 0.1–1)
MONOCYTES NFR BLD AUTO: 8.1 %
NEUTROPHILS # BLD AUTO: 4.13 X10 (3) UL (ref 1.5–7.7)
NEUTROPHILS # BLD AUTO: 4.13 X10(3) UL (ref 1.5–7.7)
NEUTROPHILS NFR BLD AUTO: 52 %
NONHDLC SERPL-MCNC: 124 MG/DL (ref ?–130)
OSMOLALITY SERPL CALC.SUM OF ELEC: 286 MOSM/KG (ref 275–295)
PATIENT FASTING Y/N/NP: YES
PATIENT FASTING Y/N/NP: YES
PLATELET # BLD AUTO: 210 10(3)UL (ref 150–450)
POTASSIUM SERPL-SCNC: 3.7 MMOL/L (ref 3.5–5.1)
RBC # BLD AUTO: 4.38 X10(6)UL (ref 4.3–5.7)
SODIUM SERPL-SCNC: 138 MMOL/L (ref 136–145)
T4 FREE SERPL-MCNC: 0.9 NG/DL (ref 0.8–1.7)
TRIGL SERPL-MCNC: 149 MG/DL (ref 30–149)
TSI SER-ACNC: 5.84 MIU/ML (ref 0.36–3.74)
VLDLC SERPL CALC-MCNC: 30 MG/DL (ref 0–30)
WBC # BLD AUTO: 8 X10(3) UL (ref 4–11)

## 2020-08-13 PROCEDURE — 80061 LIPID PANEL: CPT | Performed by: FAMILY MEDICINE

## 2020-08-13 PROCEDURE — 83036 HEMOGLOBIN GLYCOSYLATED A1C: CPT | Performed by: FAMILY MEDICINE

## 2020-08-13 PROCEDURE — 84439 ASSAY OF FREE THYROXINE: CPT | Performed by: FAMILY MEDICINE

## 2020-08-13 PROCEDURE — 80050 GENERAL HEALTH PANEL: CPT | Performed by: FAMILY MEDICINE

## 2020-08-13 PROCEDURE — 36415 COLL VENOUS BLD VENIPUNCTURE: CPT | Performed by: FAMILY MEDICINE

## 2020-08-18 ENCOUNTER — OFFICE VISIT (OUTPATIENT)
Dept: FAMILY MEDICINE CLINIC | Facility: CLINIC | Age: 55
End: 2020-08-18
Payer: COMMERCIAL

## 2020-08-18 VITALS
DIASTOLIC BLOOD PRESSURE: 84 MMHG | HEART RATE: 86 BPM | BODY MASS INDEX: 45.61 KG/M2 | HEIGHT: 69.5 IN | RESPIRATION RATE: 18 BRPM | TEMPERATURE: 97 F | WEIGHT: 315 LBS | OXYGEN SATURATION: 96 % | SYSTOLIC BLOOD PRESSURE: 142 MMHG

## 2020-08-18 DIAGNOSIS — M54.42 CHRONIC MIDLINE LOW BACK PAIN WITH BILATERAL SCIATICA: ICD-10-CM

## 2020-08-18 DIAGNOSIS — M54.41 CHRONIC MIDLINE LOW BACK PAIN WITH BILATERAL SCIATICA: ICD-10-CM

## 2020-08-18 DIAGNOSIS — M54.10 RADICULAR PAIN OF BOTH LOWER EXTREMITIES: ICD-10-CM

## 2020-08-18 DIAGNOSIS — E03.8 HYPOTHYROIDISM DUE TO HASHIMOTO'S THYROIDITIS: ICD-10-CM

## 2020-08-18 DIAGNOSIS — E11.9 TYPE 2 DIABETES MELLITUS WITHOUT COMPLICATION, WITHOUT LONG-TERM CURRENT USE OF INSULIN (HCC): ICD-10-CM

## 2020-08-18 DIAGNOSIS — N40.0 BENIGN PROSTATIC HYPERPLASIA, UNSPECIFIED WHETHER LOWER URINARY TRACT SYMPTOMS PRESENT: ICD-10-CM

## 2020-08-18 DIAGNOSIS — N32.89: ICD-10-CM

## 2020-08-18 DIAGNOSIS — Z87.891 FORMER SMOKER: ICD-10-CM

## 2020-08-18 DIAGNOSIS — G89.29 CHRONIC MIDLINE LOW BACK PAIN WITH BILATERAL SCIATICA: ICD-10-CM

## 2020-08-18 DIAGNOSIS — D49.4 BLADDER TUMOR: ICD-10-CM

## 2020-08-18 DIAGNOSIS — E06.3 HYPOTHYROIDISM DUE TO HASHIMOTO'S THYROIDITIS: ICD-10-CM

## 2020-08-18 DIAGNOSIS — E78.49 FAMILIAL MULTIPLE LIPOPROTEIN-TYPE HYPERLIPIDEMIA: ICD-10-CM

## 2020-08-18 DIAGNOSIS — I87.2 VENOUS STASIS DERMATITIS OF BOTH LOWER EXTREMITIES: ICD-10-CM

## 2020-08-18 DIAGNOSIS — Z00.00 ANNUAL PHYSICAL EXAM: Primary | ICD-10-CM

## 2020-08-18 DIAGNOSIS — I10 ESSENTIAL HYPERTENSION: ICD-10-CM

## 2020-08-18 DIAGNOSIS — M51.37 DEGENERATION OF LUMBAR OR LUMBOSACRAL INTERVERTEBRAL DISC: ICD-10-CM

## 2020-08-18 DIAGNOSIS — R60.0 PEDAL EDEMA: ICD-10-CM

## 2020-08-18 DIAGNOSIS — N32.81 OVERACTIVE BLADDER: ICD-10-CM

## 2020-08-18 PROBLEM — I77.6 VASCULITIS (HCC): Status: RESOLVED | Noted: 2019-10-23 | Resolved: 2020-08-18

## 2020-08-18 PROBLEM — L03.115 CELLULITIS OF RIGHT LEG: Status: RESOLVED | Noted: 2019-12-14 | Resolved: 2020-08-18

## 2020-08-18 PROBLEM — M53.3 SACRAL PAIN: Status: RESOLVED | Noted: 2017-07-27 | Resolved: 2020-08-18

## 2020-08-18 PROCEDURE — 3077F SYST BP >= 140 MM HG: CPT | Performed by: FAMILY MEDICINE

## 2020-08-18 PROCEDURE — 3079F DIAST BP 80-89 MM HG: CPT | Performed by: FAMILY MEDICINE

## 2020-08-18 PROCEDURE — 99396 PREV VISIT EST AGE 40-64: CPT | Performed by: FAMILY MEDICINE

## 2020-08-18 PROCEDURE — 3008F BODY MASS INDEX DOCD: CPT | Performed by: FAMILY MEDICINE

## 2020-08-18 RX ORDER — LOSARTAN POTASSIUM 100 MG/1
100 TABLET ORAL DAILY
Qty: 90 TABLET | Refills: 1 | Status: SHIPPED | OUTPATIENT
Start: 2020-08-18 | End: 2021-03-24

## 2020-08-18 RX ORDER — ATORVASTATIN CALCIUM 10 MG/1
10 TABLET, FILM COATED ORAL EVERY EVENING
Qty: 90 TABLET | Refills: 1 | Status: SHIPPED | OUTPATIENT
Start: 2020-08-18 | End: 2021-05-24

## 2020-08-18 RX ORDER — LEVOTHYROXINE SODIUM 0.12 MG/1
125 TABLET ORAL DAILY
Qty: 90 TABLET | Refills: 1 | Status: SHIPPED | OUTPATIENT
Start: 2020-08-18 | End: 2021-03-29

## 2020-08-18 RX ORDER — FUROSEMIDE 20 MG/1
20 TABLET ORAL 2 TIMES DAILY
Qty: 180 TABLET | Refills: 1 | Status: SHIPPED | OUTPATIENT
Start: 2020-08-18 | End: 2021-05-24

## 2020-08-18 NOTE — PROGRESS NOTES
[facility] [place]  PROGRESS NOTE  Chief Complaint:   Patient presents with:  Physical  Well Adult      HPI:   This is a 50year old male coming in for  Annual check     pt trying to walk and weight lift more,  Patient notes with any physical ac Cholesterol 32 (L) 40 - 59 mg/dL    Triglycerides 149 30 - 149 mg/dL    LDL Cholesterol 94 <100 mg/dL    VLDL 30 0 - 30 mg/dL    Non HDL Chol 124 <130 mg/dL    FASTING Yes    TSH W REFLEX TO FREE T4   Result Value Ref Range    TSH 5.840 (H) 0.358 - 3.740 m CHOLECYSTECTOMY     • TONSILLECTOMY       Social History:  Social History    Socioeconomic History      Marital status:       Spouse name: Not on file      Number of children: 2      Years of education: Not on file      Highest education level: Not needed. 18 g 0   • albuterol sulfate (2.5 MG/3ML) 0.083% Inhalation Nebu Soln Take 3 mL (2.5 mg total) by nebulization every 4 (four) hours as needed. 1 Box 0   • gabapentin 300 MG Oral Cap Take 300 mg by mouth daily.  Per Dr. Jia Lucia   1      Counseling gi no apparent distress. HEENT:  Head:  Normocephalic, atraumatic Eyes: EOMI, PERRLA, no scleral icterus, conjunctivae clear bilaterally, no eye discharge Ears: External normal. Nose: patent, no nasal discharge Throat:  No tonsillar erythema or exudate.   Mihaela Former smoker  Patient quit in 2019    6. Hypothyroidism due to Hashimoto's thyroiditis  TSH questionable with low T4.   Patient asymptomatic discussed with him proper use of levothyroxine by taking in the morning without taking additional medications or fo care    Encourage diet, exercise and medication to control glucose and weight    rec labs and f.u 3 months pending lab results            Meds & Refills for this Visit:  Requested Prescriptions     Signed Prescriptions Disp Refills   • atorvastatin 10 MG O

## 2020-08-18 NOTE — PATIENT INSTRUCTIONS
rec eye exam---pt to have report sent    rec pneumonia vaccine- pt to consider for next visit    rec flu vaccine this fall    Continue urology care    Encourage diet, exercise and medication to control glucose and weight    rec labs and f.u 3 months pendin

## 2020-12-28 ENCOUNTER — OFFICE VISIT (OUTPATIENT)
Dept: FAMILY MEDICINE CLINIC | Facility: CLINIC | Age: 55
End: 2020-12-28
Payer: COMMERCIAL

## 2020-12-28 VITALS
HEIGHT: 70 IN | DIASTOLIC BLOOD PRESSURE: 82 MMHG | SYSTOLIC BLOOD PRESSURE: 122 MMHG | HEART RATE: 82 BPM | BODY MASS INDEX: 45.1 KG/M2 | TEMPERATURE: 97 F | WEIGHT: 315 LBS | OXYGEN SATURATION: 96 % | RESPIRATION RATE: 20 BRPM

## 2020-12-28 DIAGNOSIS — G47.33 OBSTRUCTIVE SLEEP APNEA (ADULT) (PEDIATRIC): Primary | ICD-10-CM

## 2020-12-28 PROBLEM — C67.2 MALIGNANT NEOPLASM OF LATERAL WALL OF URINARY BLADDER (HCC): Status: ACTIVE | Noted: 2020-12-24

## 2020-12-28 PROCEDURE — 3079F DIAST BP 80-89 MM HG: CPT | Performed by: NURSE PRACTITIONER

## 2020-12-28 PROCEDURE — 3008F BODY MASS INDEX DOCD: CPT | Performed by: NURSE PRACTITIONER

## 2020-12-28 PROCEDURE — 99214 OFFICE O/P EST MOD 30 MIN: CPT | Performed by: NURSE PRACTITIONER

## 2020-12-28 PROCEDURE — 3074F SYST BP LT 130 MM HG: CPT | Performed by: NURSE PRACTITIONER

## 2020-12-28 NOTE — TELEPHONE ENCOUNTER
Future appt:    Last Appointment with provider:   8/18/2020Madonna Kwon  Last appointment at [facility]:  12/28/2020    Lab orders placed on 8/18/20- orderdue (pt was due for labs and f/u in November)    Left message for pt  Awaiting call back. Cholesterol, Total (mg/dL)   Date Value   08/13/2020 156     HDL Cholesterol (mg/dL)   Date Value   08/13/2020 32 (L)     LDL Cholesterol (mg/dL)   Date Value   08/13/2020 94     Triglycerides (mg/dL)   Date Value   08/13/2020 149     Lab Results   Component Value Date     (H) 08/13/2020    A1C 6.7 (H) 08/13/2020     Lab Results   Component Value Date    T4F 0.9 08/13/2020    TSH 5.840 (H) 08/13/2020       No follow-ups on file.

## 2020-12-28 NOTE — PROGRESS NOTES
[facility]  [facility]        HPI:   This is a 50year old male coming in for Patient presents with: Follow - Up: Sleep follow up      HPI:      Patient is present to review sleep therapy.  Feels that the pressure is decreased f Packs/day: 0.25        Quit date: 2019        Years since quittin.8      Smokeless tobacco: Never Used    Alcohol use:  Yes      Alcohol/week: 0.0 standard drinks      Comment: occ    Drug use: No    Family History:  Family History   Problem Relati hyperlipidemia     Essential hypertension     Hypothyroidism     Class 3 severe obesity due to excess calories with serious comorbidity and body mass index (BMI) of 45.0 to 49.9 in adult [facility])     Former smoker     Obstructive sleep apnea (adult) (pediatri Oral/nasal exam deferred due to COVID-19   Eyes: Conjunctivae are normal.   Neck: Normal range of motion. Neck supple. No thyromegaly present. Cardiovascular: Normal rate and regular rhythm. Exam reveals no friction rub. No murmur heard.   Pulmonary/C week.  Recommend weight loss, and maintain and optimal BMI with Exercise 30 minutes most days of the week to target heart rate . Advised patient to change filters,masks,hoses  and tubes and equiptment on a  regular schedule.   Filters and seals shall be

## 2020-12-28 NOTE — PATIENT INSTRUCTIONS
New machine ordered to [facility]. Recheck 31 - 45 days on the new machine - sooner if needed. Continue sleep therapy.        Advised if still with sleep apnea and not using CPAP has a  7 fold increase in risk of heart attack, stroke, abnormal heart rhy

## 2021-02-23 ENCOUNTER — LABORATORY ENCOUNTER (OUTPATIENT)
Dept: LAB | Age: 56
End: 2021-02-23
Attending: FAMILY MEDICINE
Payer: COMMERCIAL

## 2021-02-23 DIAGNOSIS — E11.9 TYPE 2 DIABETES MELLITUS WITHOUT COMPLICATION, WITHOUT LONG-TERM CURRENT USE OF INSULIN (HCC): ICD-10-CM

## 2021-02-23 LAB
ALBUMIN SERPL-MCNC: 3.9 G/DL (ref 3.4–5)
ALBUMIN/GLOB SERPL: 1.1 {RATIO} (ref 1–2)
ALP LIVER SERPL-CCNC: 59 U/L
ALT SERPL-CCNC: 68 U/L
ANION GAP SERPL CALC-SCNC: 6 MMOL/L (ref 0–18)
AST SERPL-CCNC: 27 U/L (ref 15–37)
BILIRUB SERPL-MCNC: 0.4 MG/DL (ref 0.1–2)
BUN BLD-MCNC: 14 MG/DL (ref 7–18)
BUN/CREAT SERPL: 15.9 (ref 10–20)
CALCIUM BLD-MCNC: 9.4 MG/DL (ref 8.5–10.1)
CHLORIDE SERPL-SCNC: 105 MMOL/L (ref 98–112)
CHOLEST SMN-MCNC: 175 MG/DL (ref ?–200)
CO2 SERPL-SCNC: 28 MMOL/L (ref 21–32)
CREAT BLD-MCNC: 0.88 MG/DL
EST. AVERAGE GLUCOSE BLD GHB EST-MCNC: 169 MG/DL (ref 68–126)
GLOBULIN PLAS-MCNC: 3.7 G/DL (ref 2.8–4.4)
GLUCOSE BLD-MCNC: 142 MG/DL (ref 70–99)
HBA1C MFR BLD HPLC: 7.5 % (ref ?–5.7)
HDLC SERPL-MCNC: 34 MG/DL (ref 40–59)
LDLC SERPL CALC-MCNC: 106 MG/DL (ref ?–100)
M PROTEIN MFR SERPL ELPH: 7.6 G/DL (ref 6.4–8.2)
NONHDLC SERPL-MCNC: 141 MG/DL (ref ?–130)
OSMOLALITY SERPL CALC.SUM OF ELEC: 291 MOSM/KG (ref 275–295)
PATIENT FASTING Y/N/NP: YES
PATIENT FASTING Y/N/NP: YES
POTASSIUM SERPL-SCNC: 3.9 MMOL/L (ref 3.5–5.1)
SODIUM SERPL-SCNC: 139 MMOL/L (ref 136–145)
T4 FREE SERPL-MCNC: 1 NG/DL (ref 0.8–1.7)
TRIGL SERPL-MCNC: 173 MG/DL (ref 30–149)
TSI SER-ACNC: 3.9 MIU/ML (ref 0.36–3.74)
VLDLC SERPL CALC-MCNC: 35 MG/DL (ref 0–30)

## 2021-02-23 PROCEDURE — 84443 ASSAY THYROID STIM HORMONE: CPT | Performed by: FAMILY MEDICINE

## 2021-02-23 PROCEDURE — 36415 COLL VENOUS BLD VENIPUNCTURE: CPT | Performed by: FAMILY MEDICINE

## 2021-02-23 PROCEDURE — 3051F HG A1C>EQUAL 7.0%<8.0%: CPT | Performed by: NURSE PRACTITIONER

## 2021-02-23 PROCEDURE — 83036 HEMOGLOBIN GLYCOSYLATED A1C: CPT | Performed by: FAMILY MEDICINE

## 2021-02-23 PROCEDURE — 80053 COMPREHEN METABOLIC PANEL: CPT | Performed by: FAMILY MEDICINE

## 2021-02-23 PROCEDURE — 80061 LIPID PANEL: CPT | Performed by: FAMILY MEDICINE

## 2021-02-23 PROCEDURE — 84439 ASSAY OF FREE THYROXINE: CPT | Performed by: FAMILY MEDICINE

## 2021-03-17 DIAGNOSIS — Z23 NEED FOR VACCINATION: ICD-10-CM

## 2021-03-24 RX ORDER — LOSARTAN POTASSIUM 100 MG/1
TABLET ORAL
Qty: 90 TABLET | Refills: 0 | Status: SHIPPED | OUTPATIENT
Start: 2021-03-24 | End: 2021-06-25

## 2021-03-24 NOTE — TELEPHONE ENCOUNTER
Future appt:     Your appointments     Date & Time Appointment Department [facility])    Mar 25, 2021  8:30 AM CDT Sleep Follow Up with Radha Castaneda, [address], [place] ([facility] [place])            [address] [place] [address]

## 2021-03-25 ENCOUNTER — OFFICE VISIT (OUTPATIENT)
Dept: FAMILY MEDICINE CLINIC | Facility: CLINIC | Age: 56
End: 2021-03-25
Payer: COMMERCIAL

## 2021-03-25 VITALS
WEIGHT: 315 LBS | OXYGEN SATURATION: 97 % | DIASTOLIC BLOOD PRESSURE: 78 MMHG | SYSTOLIC BLOOD PRESSURE: 122 MMHG | RESPIRATION RATE: 18 BRPM | HEIGHT: 70 IN | HEART RATE: 75 BPM | BODY MASS INDEX: 45.1 KG/M2 | TEMPERATURE: 97 F

## 2021-03-25 DIAGNOSIS — M54.41 CHRONIC MIDLINE LOW BACK PAIN WITH BILATERAL SCIATICA: ICD-10-CM

## 2021-03-25 DIAGNOSIS — G89.29 CHRONIC MIDLINE LOW BACK PAIN WITH BILATERAL SCIATICA: ICD-10-CM

## 2021-03-25 DIAGNOSIS — M54.42 CHRONIC MIDLINE LOW BACK PAIN WITH BILATERAL SCIATICA: ICD-10-CM

## 2021-03-25 DIAGNOSIS — G47.33 OBSTRUCTIVE SLEEP APNEA (ADULT) (PEDIATRIC): Primary | ICD-10-CM

## 2021-03-25 PROCEDURE — 3078F DIAST BP <80 MM HG: CPT | Performed by: NURSE PRACTITIONER

## 2021-03-25 PROCEDURE — 3074F SYST BP LT 130 MM HG: CPT | Performed by: NURSE PRACTITIONER

## 2021-03-25 PROCEDURE — 3008F BODY MASS INDEX DOCD: CPT | Performed by: NURSE PRACTITIONER

## 2021-03-25 PROCEDURE — 99214 OFFICE O/P EST MOD 30 MIN: CPT | Performed by: NURSE PRACTITIONER

## 2021-03-25 NOTE — PROGRESS NOTES
[facility]        HPI:   This is a 49year old male coming in for Patient presents with:  Obstructive Sleep Apnea (KANE): F/U      HPI:      Patient is present for follow-up on sleep therapy.   States that he is doing Hypothyroidism    • New onset type 2 diabetes mellitus ([facility] 75.) 2/24/2007   • Obesity    • Sleep apnea    • Type 2 diabetes mellitus without complication, without long-term current use of insulin ([facility] 75.) 2/24/2007   • Vasculitis ([facility].) 10/23/2019     Past Surgic every 4 (four) hours as needed. 18 g 0   • albuterol sulfate (2.5 MG/3ML) 0.083% Inhalation Nebu Soln Take 3 mL (2.5 mg total) by nebulization every 4 (four) hours as needed. 1 Box 0   • gabapentin 300 MG Oral Cap Take 300 mg by mouth daily.  Per Dr. Chau Farmer Wt Readings from Last 6 Encounters:  03/25/21 : (!) 348 lb 12.8 oz (158.2 kg)  12/28/20 : (!) 342 lb (155.1 kg)  08/18/20 : (!) 330 lb (149.7 kg)  02/25/20 : (!) 342 lb (155.1 kg)  01/13/20 : (!) 339 lb (153.8 kg)  12/23/19 : (!) 343 lb 9.6 oz (155.9 kg CPAP has a  7 fold increase in risk of heart attack, stroke, abnormal heart rhythm  and death,  increased risk of driving accidents. Advised to refrain from driving when sleepy.     COMPLIANCE is required by insurance for 4 hours a night most nights of th APRN  3/25/2021  8:53 AM

## 2021-03-29 RX ORDER — LEVOTHYROXINE SODIUM 0.12 MG/1
TABLET ORAL
Qty: 90 TABLET | Refills: 0 | Status: SHIPPED | OUTPATIENT
Start: 2021-03-29 | End: 2021-06-25

## 2021-03-29 NOTE — TELEPHONE ENCOUNTER
Future appt:     Your appointments     Date & Time Appointment Department [facility])    Mar 31, 2021  3:30 PM CDT Exam - Established with Chauncey Henry MD [address] [place] [address] ([facility])        Sep 23, 2021 1

## 2021-03-31 ENCOUNTER — OFFICE VISIT (OUTPATIENT)
Dept: FAMILY MEDICINE CLINIC | Facility: CLINIC | Age: 56
End: 2021-03-31
Payer: COMMERCIAL

## 2021-03-31 VITALS
TEMPERATURE: 98 F | OXYGEN SATURATION: 97 % | WEIGHT: 315 LBS | HEART RATE: 86 BPM | RESPIRATION RATE: 18 BRPM | SYSTOLIC BLOOD PRESSURE: 136 MMHG | DIASTOLIC BLOOD PRESSURE: 84 MMHG | HEIGHT: 70 IN | BODY MASS INDEX: 45.1 KG/M2

## 2021-03-31 DIAGNOSIS — E78.49 FAMILIAL MULTIPLE LIPOPROTEIN-TYPE HYPERLIPIDEMIA: ICD-10-CM

## 2021-03-31 DIAGNOSIS — E03.8 HYPOTHYROIDISM DUE TO HASHIMOTO'S THYROIDITIS: ICD-10-CM

## 2021-03-31 DIAGNOSIS — N40.0 BENIGN PROSTATIC HYPERPLASIA, UNSPECIFIED WHETHER LOWER URINARY TRACT SYMPTOMS PRESENT: ICD-10-CM

## 2021-03-31 DIAGNOSIS — E06.3 HYPOTHYROIDISM DUE TO HASHIMOTO'S THYROIDITIS: ICD-10-CM

## 2021-03-31 DIAGNOSIS — Z00.00 ANNUAL PHYSICAL EXAM: ICD-10-CM

## 2021-03-31 DIAGNOSIS — E11.9 TYPE 2 DIABETES MELLITUS WITHOUT COMPLICATION, WITHOUT LONG-TERM CURRENT USE OF INSULIN (HCC): Primary | ICD-10-CM

## 2021-03-31 DIAGNOSIS — I10 ESSENTIAL HYPERTENSION: ICD-10-CM

## 2021-03-31 PROCEDURE — 3008F BODY MASS INDEX DOCD: CPT | Performed by: FAMILY MEDICINE

## 2021-03-31 PROCEDURE — 3075F SYST BP GE 130 - 139MM HG: CPT | Performed by: FAMILY MEDICINE

## 2021-03-31 PROCEDURE — 3079F DIAST BP 80-89 MM HG: CPT | Performed by: FAMILY MEDICINE

## 2021-03-31 PROCEDURE — 99214 OFFICE O/P EST MOD 30 MIN: CPT | Performed by: FAMILY MEDICINE

## 2021-03-31 RX ORDER — METFORMIN HYDROCHLORIDE 750 MG/1
750 TABLET, EXTENDED RELEASE ORAL DAILY
Qty: 180 TABLET | Refills: 1 | Status: SHIPPED | OUTPATIENT
Start: 2021-03-31 | End: 2021-11-22

## 2021-03-31 NOTE — PROGRESS NOTES
[facility] [place]  PROGRESS NOTE  Chief Complaint:   Patient presents with:  Diabetes      HPI:   This is a 49year old male here for medical follow-up.   Patient feels that he is doing stable in his medical status but he is trying to improve Calcium, Total 9.4 8.5 - 10.1 mg/dL    Calculated Osmolality 291 275 - 295 mOsm/kg    GFR, Non- 97 >=60    GFR, -American 113 >=60    AST 27 15 - 37 U/L    ALT 68 (H) 16 - 61 U/L    Alkaline Phosphatase 59 45 - 117 U/L    Bilirubin, Lipids Father    • Obesity Father    • Lung Disorder Father    • Hypertension Mother    • Lipids Mother    • Obesity Mother    • Arrhythmia Mother      Allergies:  No Known Allergies  Current Meds:  Current Outpatient Medications   Medication Sig Dispense sputum. GASTROINTESTINAL:  Denies abdominal pain, nausea, vomiting, constipation, diarrhea, or blood in stool. MUSCULOSKELETAL:  Denies weakness, muscle aches, back pain, joint pain, swelling or stiffness.   NEUROLOGICAL:  Denies headache, dizziness, sync diabetes. Diagnoses and all orders for this visit:    Type 2 diabetes mellitus without complication, without long-term current use of insulin (HCC)  -     CBC WITH DIFFERENTIAL WITH PLATELET; Future  -     COMP METABOLIC PANEL (14);  Future  -     HEMOGL rec increase metformin to 750 mg 2 x a day    Recheck labs - and physical in August      Encourage back stretches and activity    Continue care per sleep medicine    rec covid vaccine              Health Maintenance:  Diabetes Care Dilated Eye Exam Never

## 2021-05-22 NOTE — TELEPHONE ENCOUNTER
Future appt:     Your appointments     Date & Time Appointment Department [facility])    Sep 23, 2021 10:00 AM CDT Sleep Follow Up with Michael Chowdhury, [address], [place]            [facility]

## 2021-05-24 RX ORDER — ATORVASTATIN CALCIUM 10 MG/1
TABLET, FILM COATED ORAL
Qty: 90 TABLET | Refills: 0 | Status: SHIPPED | OUTPATIENT
Start: 2021-05-24 | End: 2021-11-15

## 2021-05-24 RX ORDER — FUROSEMIDE 20 MG/1
TABLET ORAL
Qty: 180 TABLET | Refills: 0 | Status: SHIPPED | OUTPATIENT
Start: 2021-05-24 | End: 2021-08-28

## 2021-06-25 RX ORDER — LOSARTAN POTASSIUM 100 MG/1
TABLET ORAL
Qty: 90 TABLET | Refills: 0 | Status: SHIPPED | OUTPATIENT
Start: 2021-06-25 | End: 2021-09-24

## 2021-06-25 RX ORDER — LEVOTHYROXINE SODIUM 125 UG/1
TABLET ORAL
Qty: 90 TABLET | Refills: 0 | Status: SHIPPED | OUTPATIENT
Start: 2021-06-25 | End: 2021-09-24

## 2021-06-25 NOTE — TELEPHONE ENCOUNTER
Future appt:     Your appointments     Date & Time Appointment Department [facility])    Sep 23, 2021 10:00 AM CDT Sleep Follow Up with Kennedy Guzmán, [address], [place]            [address] [place] [address]

## 2021-08-28 RX ORDER — FUROSEMIDE 20 MG/1
TABLET ORAL
Qty: 180 TABLET | Refills: 0 | Status: SHIPPED | OUTPATIENT
Start: 2021-08-28 | End: 2021-11-22

## 2021-08-28 NOTE — TELEPHONE ENCOUNTER
Future appt:    Last Appointment with provider:   3/31/2021  Last appointment at [facility]:  3/31/2021  Last px: 8/18/20    Furosemide refilled 5/24/21 for #180, 0 refills    Pt due for wellness exam and labs (orders noted on file- due at this time)

## 2021-08-28 NOTE — TELEPHONE ENCOUNTER
Future Appointments   Date Time Provider Willem Romero   10/6/2021  9:30 AM Kristina Tripp MD [facility] [place] [facility]

## 2021-09-24 RX ORDER — LOSARTAN POTASSIUM 100 MG/1
TABLET ORAL
Qty: 90 TABLET | Refills: 0 | Status: SHIPPED | OUTPATIENT
Start: 2021-09-24 | End: 2021-11-22

## 2021-09-24 RX ORDER — LEVOTHYROXINE SODIUM 125 UG/1
TABLET ORAL
Qty: 90 TABLET | Refills: 0 | Status: SHIPPED | OUTPATIENT
Start: 2021-09-24 | End: 2021-11-22

## 2021-09-24 NOTE — TELEPHONE ENCOUNTER
Future appt:     Your appointments     Date & Time Appointment Department [facility])    Oct 06, 2021  9:30 AM CDT Physical - Established with Sonia Strickland MD [address] [facility] [place])            [facility]

## 2021-10-22 ENCOUNTER — OFFICE VISIT (OUTPATIENT)
Dept: FAMILY MEDICINE CLINIC | Facility: CLINIC | Age: 56
End: 2021-10-22
Payer: COMMERCIAL

## 2021-10-22 VITALS
HEIGHT: 70 IN | TEMPERATURE: 98 F | RESPIRATION RATE: 16 BRPM | SYSTOLIC BLOOD PRESSURE: 120 MMHG | OXYGEN SATURATION: 96 % | DIASTOLIC BLOOD PRESSURE: 76 MMHG | BODY MASS INDEX: 45.1 KG/M2 | WEIGHT: 315 LBS | HEART RATE: 77 BPM

## 2021-10-22 DIAGNOSIS — R20.0 NUMBNESS OF TOES: ICD-10-CM

## 2021-10-22 DIAGNOSIS — E11.9 TYPE 2 DIABETES MELLITUS WITHOUT COMPLICATION, WITHOUT LONG-TERM CURRENT USE OF INSULIN (HCC): Primary | ICD-10-CM

## 2021-10-22 DIAGNOSIS — M54.10 RADICULAR PAIN OF BOTH LOWER EXTREMITIES: ICD-10-CM

## 2021-10-22 PROCEDURE — 99214 OFFICE O/P EST MOD 30 MIN: CPT | Performed by: NURSE PRACTITIONER

## 2021-10-22 PROCEDURE — 3078F DIAST BP <80 MM HG: CPT | Performed by: NURSE PRACTITIONER

## 2021-10-22 PROCEDURE — 3008F BODY MASS INDEX DOCD: CPT | Performed by: NURSE PRACTITIONER

## 2021-10-22 PROCEDURE — 83036 HEMOGLOBIN GLYCOSYLATED A1C: CPT | Performed by: NURSE PRACTITIONER

## 2021-10-22 PROCEDURE — 3044F HG A1C LEVEL LT 7.0%: CPT | Performed by: NURSE PRACTITIONER

## 2021-10-22 PROCEDURE — 3074F SYST BP LT 130 MM HG: CPT | Performed by: NURSE PRACTITIONER

## 2021-10-22 NOTE — PATIENT INSTRUCTIONS
A1C 6.8. Referral done to Dr. Yanet Branch. Please call Benjy Dunham patient scheduling at [phone] for appointment for ankle brachial index. Keep appointment with Dr. Jayy Arellano for next month. Follow up as needed.

## 2021-10-22 NOTE — PROGRESS NOTES
HPI:    Patient ID: Sergio  is a 49year old male. HPI     Patient is present with concern about pain to his toes. Feels like frost bite. Worse on left. Right just big toe. Left toes 1 -3 . Started the beginning of October.  Has back pain (for o meals. (Patient not taking: Reported on 10/22/2021) 180 tablet 1     Allergies:No Known Allergies   PHYSICAL EXAM:      10/22/21  1034   BP: 120/76   Pulse: 77   Resp: 16   Temp: 97.6 °F (36.4 °C)   TempSrc: Temporal   SpO2: 96%   Weight: (!) 339 lb 9.6 oz

## 2021-11-03 ENCOUNTER — TELEPHONE (OUTPATIENT)
Dept: FAMILY MEDICINE CLINIC | Facility: CLINIC | Age: 56
End: 2021-11-03

## 2021-11-03 NOTE — TELEPHONE ENCOUNTER
EDNA is normal except the blood pressure was very high during the study. Please see if patient is still taking the losartan 100 mg and the lasix 20 mg daily. Thank you.

## 2021-11-03 NOTE — TELEPHONE ENCOUNTER
Patient states he has been taking his prescribed losartan and lasix. He had taken his losartan the morning of the EDNA but not the lasix.

## 2021-11-03 NOTE — TELEPHONE ENCOUNTER
Spoke with pt regarding the below. He has his physical scheduled with Dr. Bubba Thomas. Will bring BP log to that appt.

## 2021-11-03 NOTE — TELEPHONE ENCOUNTER
Check and record blood pressure at home daily. Call if > 170/100. Schedule follow up in the next 4 weeks and bring results to the appointment. Thank you.

## 2021-11-15 RX ORDER — ATORVASTATIN CALCIUM 10 MG/1
TABLET, FILM COATED ORAL
Qty: 90 TABLET | Refills: 0 | Status: SHIPPED | OUTPATIENT
Start: 2021-11-15 | End: 2021-11-22

## 2021-11-15 NOTE — TELEPHONE ENCOUNTER
Future appt:     Your appointments     Date & Time Appointment Department [facility])    Nov 18, 2021  9:00 AM CST Laboratory Visit with REF [facility] ([facility])        Nov 22, 2021  4:30 PM CST Physical - Established with Chemo

## 2021-11-18 ENCOUNTER — LABORATORY ENCOUNTER (OUTPATIENT)
Dept: LAB | Age: 56
End: 2021-11-18
Attending: FAMILY MEDICINE
Payer: COMMERCIAL

## 2021-11-18 DIAGNOSIS — Z00.00 ANNUAL PHYSICAL EXAM: ICD-10-CM

## 2021-11-18 DIAGNOSIS — E11.9 TYPE 2 DIABETES MELLITUS WITHOUT COMPLICATION, WITHOUT LONG-TERM CURRENT USE OF INSULIN (HCC): ICD-10-CM

## 2021-11-18 DIAGNOSIS — E06.3 HYPOTHYROIDISM DUE TO HASHIMOTO'S THYROIDITIS: ICD-10-CM

## 2021-11-18 DIAGNOSIS — E03.8 HYPOTHYROIDISM DUE TO HASHIMOTO'S THYROIDITIS: ICD-10-CM

## 2021-11-18 DIAGNOSIS — N40.0 BENIGN PROSTATIC HYPERPLASIA, UNSPECIFIED WHETHER LOWER URINARY TRACT SYMPTOMS PRESENT: ICD-10-CM

## 2021-11-18 PROCEDURE — 81001 URINALYSIS AUTO W/SCOPE: CPT | Performed by: FAMILY MEDICINE

## 2021-11-18 PROCEDURE — 82570 ASSAY OF URINE CREATININE: CPT | Performed by: FAMILY MEDICINE

## 2021-11-18 PROCEDURE — 80050 GENERAL HEALTH PANEL: CPT | Performed by: FAMILY MEDICINE

## 2021-11-18 PROCEDURE — 82607 VITAMIN B-12: CPT | Performed by: FAMILY MEDICINE

## 2021-11-18 PROCEDURE — 80061 LIPID PANEL: CPT | Performed by: FAMILY MEDICINE

## 2021-11-18 PROCEDURE — 82043 UR ALBUMIN QUANTITATIVE: CPT | Performed by: FAMILY MEDICINE

## 2021-11-18 PROCEDURE — 84439 ASSAY OF FREE THYROXINE: CPT | Performed by: FAMILY MEDICINE

## 2021-11-18 PROCEDURE — 84153 ASSAY OF PSA TOTAL: CPT | Performed by: FAMILY MEDICINE

## 2021-11-18 PROCEDURE — 82306 VITAMIN D 25 HYDROXY: CPT | Performed by: FAMILY MEDICINE

## 2021-11-18 PROCEDURE — 3061F NEG MICROALBUMINURIA REV: CPT | Performed by: FAMILY MEDICINE

## 2021-11-19 ENCOUNTER — TELEPHONE (OUTPATIENT)
Dept: FAMILY MEDICINE CLINIC | Facility: CLINIC | Age: 56
End: 2021-11-19

## 2021-11-19 NOTE — TELEPHONE ENCOUNTER
----- Message from Derek Kruger MD sent at 11/19/2021  8:02 AM CST -----  Results reviewed. Patient urinalysisWith trace ketone and elevated urobilinogen.   Urine microalbumin normal.  Chemistry profile shows glucose fasting 144  iver and kidney func

## 2021-11-22 ENCOUNTER — OFFICE VISIT (OUTPATIENT)
Dept: FAMILY MEDICINE CLINIC | Facility: CLINIC | Age: 56
End: 2021-11-22
Payer: COMMERCIAL

## 2021-11-22 VITALS
HEART RATE: 84 BPM | WEIGHT: 315 LBS | SYSTOLIC BLOOD PRESSURE: 126 MMHG | DIASTOLIC BLOOD PRESSURE: 78 MMHG | TEMPERATURE: 97 F | HEIGHT: 70 IN | OXYGEN SATURATION: 96 % | RESPIRATION RATE: 18 BRPM | BODY MASS INDEX: 45.1 KG/M2

## 2021-11-22 DIAGNOSIS — E03.8 HYPOTHYROIDISM DUE TO HASHIMOTO'S THYROIDITIS: ICD-10-CM

## 2021-11-22 DIAGNOSIS — E78.49 FAMILIAL MULTIPLE LIPOPROTEIN-TYPE HYPERLIPIDEMIA: ICD-10-CM

## 2021-11-22 DIAGNOSIS — I10 ESSENTIAL HYPERTENSION: ICD-10-CM

## 2021-11-22 DIAGNOSIS — E06.3 HYPOTHYROIDISM DUE TO HASHIMOTO'S THYROIDITIS: ICD-10-CM

## 2021-11-22 DIAGNOSIS — C67.2 MALIGNANT NEOPLASM OF LATERAL WALL OF URINARY BLADDER (HCC): ICD-10-CM

## 2021-11-22 DIAGNOSIS — M51.37 DEGENERATION OF LUMBAR OR LUMBOSACRAL INTERVERTEBRAL DISC: ICD-10-CM

## 2021-11-22 DIAGNOSIS — E66.01 CLASS 3 SEVERE OBESITY DUE TO EXCESS CALORIES WITH SERIOUS COMORBIDITY AND BODY MASS INDEX (BMI) OF 45.0 TO 49.9 IN ADULT (HCC): ICD-10-CM

## 2021-11-22 DIAGNOSIS — Z00.00 ANNUAL PHYSICAL EXAM: Primary | ICD-10-CM

## 2021-11-22 DIAGNOSIS — G47.33 OBSTRUCTIVE SLEEP APNEA (ADULT) (PEDIATRIC): ICD-10-CM

## 2021-11-22 DIAGNOSIS — E11.9 TYPE 2 DIABETES MELLITUS WITHOUT COMPLICATION, WITHOUT LONG-TERM CURRENT USE OF INSULIN (HCC): ICD-10-CM

## 2021-11-22 DIAGNOSIS — E55.9 VITAMIN D DEFICIENCY: ICD-10-CM

## 2021-11-22 PROCEDURE — 3008F BODY MASS INDEX DOCD: CPT | Performed by: FAMILY MEDICINE

## 2021-11-22 PROCEDURE — 3078F DIAST BP <80 MM HG: CPT | Performed by: FAMILY MEDICINE

## 2021-11-22 PROCEDURE — 3074F SYST BP LT 130 MM HG: CPT | Performed by: FAMILY MEDICINE

## 2021-11-22 PROCEDURE — 99396 PREV VISIT EST AGE 40-64: CPT | Performed by: FAMILY MEDICINE

## 2021-11-22 PROCEDURE — 93000 ELECTROCARDIOGRAM COMPLETE: CPT | Performed by: FAMILY MEDICINE

## 2021-11-22 RX ORDER — FUROSEMIDE 20 MG/1
20 TABLET ORAL 2 TIMES DAILY
Qty: 180 TABLET | Refills: 1 | Status: SHIPPED | OUTPATIENT
Start: 2021-11-22

## 2021-11-22 RX ORDER — ATORVASTATIN CALCIUM 10 MG/1
10 TABLET, FILM COATED ORAL EVERY EVENING
Qty: 90 TABLET | Refills: 1 | Status: SHIPPED | OUTPATIENT
Start: 2021-11-22

## 2021-11-22 RX ORDER — LOSARTAN POTASSIUM 100 MG/1
100 TABLET ORAL DAILY
Qty: 90 TABLET | Refills: 1 | Status: SHIPPED | OUTPATIENT
Start: 2021-11-22 | End: 2021-12-29

## 2021-11-22 RX ORDER — ERGOCALCIFEROL 1.25 MG/1
50000 CAPSULE ORAL WEEKLY
Qty: 12 CAPSULE | Refills: 0 | Status: SHIPPED | OUTPATIENT
Start: 2021-11-22 | End: 2022-02-08

## 2021-11-22 RX ORDER — METFORMIN HYDROCHLORIDE 750 MG/1
750 TABLET, EXTENDED RELEASE ORAL DAILY
Qty: 180 TABLET | Refills: 1 | Status: SHIPPED | OUTPATIENT
Start: 2021-11-22 | End: 2021-12-20

## 2021-11-22 RX ORDER — LEVOTHYROXINE SODIUM 0.12 MG/1
125 TABLET ORAL DAILY
Qty: 90 TABLET | Refills: 1 | Status: SHIPPED | OUTPATIENT
Start: 2021-11-22

## 2021-11-22 NOTE — PROGRESS NOTES
[address]  PROGRESS NOTE  Chief Complaint:   Patient presents with:   Well Adult      HPI:   This is a 49year old male coming in for annual check up    Pt seeing urology - stable- treatments- for bladder cancer    Pt diabetic-- not test Triglycerides 234 (H) 30 - 149 mg/dL    LDL Cholesterol 150 (H) <100 mg/dL    VLDL 45 (H) 0 - 30 mg/dL    Non HDL Chol 193 (H) <130 mg/dL    FASTING Yes    TSH+FREE T4   Result Value Ref Range    Free T4 0.9 0.8 - 1.7 ng/dL    TSH 3.390 0.358 - 3.740 mIU/m Eosinophil % 3.1 %    Basophil % 0.9 %    Immature Granulocyte % 0.4 %       Wt Readings from Last 6 Encounters:  11/22/21 : (!) 338 lb (153.3 kg)  10/22/21 : (!) 339 lb 9.6 oz (154 kg)  03/31/21 : (!) 345 lb 9.6 oz (156.8 kg)  03/25/21 : (!) 348 lb 12. mouth once a week for 12 doses. 12 capsule 0   • atorvastatin 10 MG Oral Tab Take 1 tablet (10 mg total) by mouth every evening. 90 tablet 1   • levothyroxine (EUTHYROX) 125 MCG Oral Tab Take 1 tablet (125 mcg total) by mouth daily.  90 tablet 1   • losarta stiffness. NEUROLOGICAL:  Denies headache, seizures, dizziness, syncope, paralysis, ataxia, numbness or tingling in the extremities  HEMATOLOGIC:  Denies anemia, bleeding or bruising.   LYMPHATICS:  Denies enlarged nodes  PSYCHIATRIC:  Denies depression or Behavior is normal. Judgement and thought content are normal      EKG Sinus rhythm rate 76  R SR V1 nondiagnostic. Stable reassuring EKG  ASSESSMENT AND PLAN:   1.  Annual physical exam  General health check and cancer screening  - ELECTROCARDIOGRAM, COM lipoprotein-type hyperlipidemia     Essential hypertension     Hypothyroidism     Class 3 severe obesity due to excess calories with serious comorbidity and body mass index (BMI) of 45.0 to 49.9 in adult [facility])     Former smoker     Obstructive sleep apnea any questions, complications, allergies, or worsening or changing symptoms. Patient is to call with any side effects or complications from the treatments as a result of today.

## 2021-11-22 NOTE — PATIENT INSTRUCTIONS
Recheck fasting labs 6 months    rec vit D suppliment weekly  Recheck 3 months    Continue medication    Encourage diet and exercise with goal weight loss    ekg- stable and reassuring    Vaccines- advised: Covid, flu, pneumonia and shingle

## 2021-12-20 RX ORDER — LOSARTAN POTASSIUM 100 MG/1
TABLET ORAL
Qty: 90 TABLET | Refills: 0 | OUTPATIENT
Start: 2021-12-20

## 2021-12-20 RX ORDER — METFORMIN HYDROCHLORIDE 750 MG/1
TABLET, EXTENDED RELEASE ORAL
Qty: 180 TABLET | Refills: 0 | Status: SHIPPED | OUTPATIENT
Start: 2021-12-20

## 2021-12-20 NOTE — TELEPHONE ENCOUNTER
Future appt:    Last Appointment with provider:   11/22/21 physical  Last appointment at [facility] [place]:  11/22/2021  Cholesterol, Total (mg/dL)   Date Value   11/18/2021 226 (H)     HDL Cholesterol (mg/dL)   Date Value   11/18/2021 33 (L)     LDL Cholestero

## 2021-12-20 NOTE — TELEPHONE ENCOUNTER
Future appt:    Last Appointment with provider:   11/22/2021  Last appointment at [facility] [place]:  11/22/2021  Last px: 11/22/2021    Cholesterol, Total (mg/dL)   Date Value   11/18/2021 226 (H)     HDL Cholesterol (mg/dL)   Date Value   11/18/2021 33 (L)

## 2021-12-27 RX ORDER — LOSARTAN POTASSIUM 100 MG/1
TABLET ORAL
Qty: 90 TABLET | Refills: 0 | OUTPATIENT
Start: 2021-12-27

## 2021-12-29 ENCOUNTER — TELEPHONE (OUTPATIENT)
Dept: FAMILY MEDICINE CLINIC | Facility: CLINIC | Age: 56
End: 2021-12-29

## 2021-12-29 RX ORDER — LOSARTAN POTASSIUM 50 MG/1
100 TABLET ORAL DAILY
Qty: 180 TABLET | Refills: 1 | Status: SHIPPED | OUTPATIENT
Start: 2021-12-29

## 2021-12-29 NOTE — TELEPHONE ENCOUNTER
Future appt:    Last Appointment with provider:   11/22/2021  Last appointment at [facility] [place]:  11/22/2021      Fax from [address] [place] [address] reported on 100mg dos  Request to get a temporary RX for 50mg pt to pt take 2 daily.     Attempted to

## 2021-12-30 NOTE — TELEPHONE ENCOUNTER
----- Message from Alissa Rodriguez sent at 12/30/2021 10:53 AM CST -----  [phone]. Return call.  Does understand your instructions that you left on voice mail

## 2022-03-14 RX ORDER — ATORVASTATIN CALCIUM 10 MG/1
TABLET, FILM COATED ORAL
Qty: 90 TABLET | Refills: 0 | Status: SHIPPED | OUTPATIENT
Start: 2022-03-14

## 2022-03-14 NOTE — TELEPHONE ENCOUNTER
Future appt:    Last Appointment with provider:   11/22/2021 for annual physical.  Last appointment at [facility] [place]:  11/22/2021  Cholesterol, Total (mg/dL)   Date Value   11/18/2021 226 (H)     HDL Cholesterol (mg/dL)   Date Value   11/18/2021 33 (L)     LDL Cholesterol (mg/dL)   Date Value   11/18/2021 150 (H)     Triglycerides (mg/dL)   Date Value   11/18/2021 234 (H)     Lab Results   Component Value Date     (H) 02/23/2021    A1C 6.8 (A) 10/22/2021     Lab Results   Component Value Date    T4F 0.9 11/18/2021    TSH 3.390 11/18/2021       No follow-ups on file.

## 2022-05-12 NOTE — TELEPHONE ENCOUNTER
Patient is due for fasting labs on or after 5/18/22. LM for patient to return call and schedule this appointment.

## 2022-05-14 NOTE — TELEPHONE ENCOUNTER
Future appt:    Last Appointment with provider:   11/22/2021  Last appointment at [facility] [place]:  11/22/2021- [address]  Pt due for 6 month follow up and labs    Furosemide refilled 11/22/21 for #180, 0 refills    LM for pt    Cholesterol, Total (mg/dL)   Date Value   11/18/2021 226 (H)     HDL Cholesterol (mg/dL)   Date Value   11/18/2021 33 (L)     LDL Cholesterol (mg/dL)   Date Value   11/18/2021 150 (H)     Triglycerides (mg/dL)   Date Value   11/18/2021 234 (H)     Lab Results   Component Value Date     (H) 02/23/2021    A1C 6.8 (A) 10/22/2021     Lab Results   Component Value Date    T4F 0.9 11/18/2021    TSH 3.390 11/18/2021       No follow-ups on file.

## 2022-05-17 RX ORDER — FUROSEMIDE 20 MG/1
TABLET ORAL
Qty: 60 TABLET | Refills: 0 | Status: SHIPPED | OUTPATIENT
Start: 2022-05-17

## 2022-05-17 NOTE — TELEPHONE ENCOUNTER
Pt contacted- appt's.scheduled.     Future Appointments   Date Time Provider Willem Heather   6/3/2022  9:30 AM REF [place] [facility]   6/24/2022  4:00 PM Taylor Brooks MD [facility] [place] [facility] [place]

## 2022-06-22 ENCOUNTER — LABORATORY ENCOUNTER (OUTPATIENT)
Dept: LAB | Age: 57
End: 2022-06-22
Attending: FAMILY MEDICINE
Payer: COMMERCIAL

## 2022-06-22 DIAGNOSIS — E11.9 TYPE 2 DIABETES MELLITUS WITHOUT COMPLICATION, WITHOUT LONG-TERM CURRENT USE OF INSULIN (HCC): ICD-10-CM

## 2022-06-22 DIAGNOSIS — E55.9 VITAMIN D DEFICIENCY: ICD-10-CM

## 2022-06-22 LAB
ALBUMIN SERPL-MCNC: 3.7 G/DL (ref 3.4–5)
ALBUMIN/GLOB SERPL: 1.1 {RATIO} (ref 1–2)
ALP LIVER SERPL-CCNC: 52 U/L
ALT SERPL-CCNC: 60 U/L
ANION GAP SERPL CALC-SCNC: 6 MMOL/L (ref 0–18)
AST SERPL-CCNC: 28 U/L (ref 15–37)
BASOPHILS # BLD AUTO: 0.04 X10(3) UL (ref 0–0.2)
BASOPHILS NFR BLD AUTO: 0.5 %
BILIRUB SERPL-MCNC: 0.5 MG/DL (ref 0.1–2)
BILIRUB UR QL STRIP.AUTO: NEGATIVE
BUN BLD-MCNC: 16 MG/DL (ref 7–18)
CALCIUM BLD-MCNC: 9 MG/DL (ref 8.5–10.1)
CHLORIDE SERPL-SCNC: 108 MMOL/L (ref 98–112)
CHOLEST SERPL-MCNC: 138 MG/DL (ref ?–200)
CLARITY UR REFRACT.AUTO: CLEAR
CO2 SERPL-SCNC: 26 MMOL/L (ref 21–32)
COLOR UR AUTO: YELLOW
CREAT BLD-MCNC: 0.84 MG/DL
CREAT UR-SCNC: 260 MG/DL
EOSINOPHIL # BLD AUTO: 0.25 X10(3) UL (ref 0–0.7)
EOSINOPHIL NFR BLD AUTO: 2.8 %
ERYTHROCYTE [DISTWIDTH] IN BLOOD BY AUTOMATED COUNT: 13 %
EST. AVERAGE GLUCOSE BLD GHB EST-MCNC: 200 MG/DL (ref 68–126)
FASTING PATIENT LIPID ANSWER: YES
FASTING STATUS PATIENT QL REPORTED: YES
GLOBULIN PLAS-MCNC: 3.3 G/DL (ref 2.8–4.4)
GLUCOSE BLD-MCNC: 161 MG/DL (ref 70–99)
GLUCOSE UR STRIP.AUTO-MCNC: 250 MG/DL
HBA1C MFR BLD: 8.6 % (ref ?–5.7)
HCT VFR BLD AUTO: 42.5 %
HDLC SERPL-MCNC: 32 MG/DL (ref 40–59)
HGB BLD-MCNC: 13.7 G/DL
IMM GRANULOCYTES # BLD AUTO: 0.04 X10(3) UL (ref 0–1)
IMM GRANULOCYTES NFR BLD: 0.5 %
LDLC SERPL CALC-MCNC: 81 MG/DL (ref ?–100)
LEUKOCYTE ESTERASE UR QL STRIP.AUTO: NEGATIVE
LYMPHOCYTES # BLD AUTO: 2.45 X10(3) UL (ref 1–4)
LYMPHOCYTES NFR BLD AUTO: 27.8 %
MAGNESIUM SERPL-MCNC: 2.2 MG/DL (ref 1.6–2.6)
MCH RBC QN AUTO: 30.9 PG (ref 26–34)
MCHC RBC AUTO-ENTMCNC: 32.2 G/DL (ref 31–37)
MCV RBC AUTO: 95.9 FL
MICROALBUMIN UR-MCNC: 1.07 MG/DL
MICROALBUMIN/CREAT 24H UR-RTO: 4.1 UG/MG (ref ?–30)
MONOCYTES # BLD AUTO: 0.61 X10(3) UL (ref 0.1–1)
MONOCYTES NFR BLD AUTO: 6.9 %
NEUTROPHILS # BLD AUTO: 5.41 X10 (3) UL (ref 1.5–7.7)
NEUTROPHILS # BLD AUTO: 5.41 X10(3) UL (ref 1.5–7.7)
NEUTROPHILS NFR BLD AUTO: 61.5 %
NITRITE UR QL STRIP.AUTO: NEGATIVE
NONHDLC SERPL-MCNC: 106 MG/DL (ref ?–130)
OSMOLALITY SERPL CALC.SUM OF ELEC: 295 MOSM/KG (ref 275–295)
PH UR STRIP.AUTO: 5.5 [PH] (ref 5–8)
PLATELET # BLD AUTO: 198 10(3)UL (ref 150–450)
POTASSIUM SERPL-SCNC: 3.8 MMOL/L (ref 3.5–5.1)
PROT SERPL-MCNC: 7 G/DL (ref 6.4–8.2)
PROT UR STRIP.AUTO-MCNC: NEGATIVE MG/DL
RBC # BLD AUTO: 4.43 X10(6)UL
RBC UR QL AUTO: NEGATIVE
SODIUM SERPL-SCNC: 140 MMOL/L (ref 136–145)
SP GR UR STRIP.AUTO: >=1.03 (ref 1–1.03)
TRIGL SERPL-MCNC: 142 MG/DL (ref 30–149)
TSI SER-ACNC: 3.62 MIU/ML (ref 0.36–3.74)
UROBILINOGEN UR STRIP.AUTO-MCNC: 2 MG/DL
VIT D+METAB SERPL-MCNC: 30.7 NG/ML (ref 30–100)
VLDLC SERPL CALC-MCNC: 22 MG/DL (ref 0–30)
WBC # BLD AUTO: 8.8 X10(3) UL (ref 4–11)

## 2022-06-22 PROCEDURE — 83036 HEMOGLOBIN GLYCOSYLATED A1C: CPT | Performed by: FAMILY MEDICINE

## 2022-06-22 PROCEDURE — 82570 ASSAY OF URINE CREATININE: CPT | Performed by: FAMILY MEDICINE

## 2022-06-22 PROCEDURE — 80050 GENERAL HEALTH PANEL: CPT | Performed by: FAMILY MEDICINE

## 2022-06-22 PROCEDURE — 83735 ASSAY OF MAGNESIUM: CPT | Performed by: FAMILY MEDICINE

## 2022-06-22 PROCEDURE — 3061F NEG MICROALBUMINURIA REV: CPT | Performed by: FAMILY MEDICINE

## 2022-06-22 PROCEDURE — 82306 VITAMIN D 25 HYDROXY: CPT | Performed by: FAMILY MEDICINE

## 2022-06-22 PROCEDURE — 81003 URINALYSIS AUTO W/O SCOPE: CPT | Performed by: FAMILY MEDICINE

## 2022-06-22 PROCEDURE — 82043 UR ALBUMIN QUANTITATIVE: CPT | Performed by: FAMILY MEDICINE

## 2022-06-22 PROCEDURE — 80061 LIPID PANEL: CPT | Performed by: FAMILY MEDICINE

## 2022-06-22 PROCEDURE — 3052F HG A1C>EQUAL 8.0%<EQUAL 9.0%: CPT | Performed by: FAMILY MEDICINE

## 2022-06-24 ENCOUNTER — OFFICE VISIT (OUTPATIENT)
Dept: FAMILY MEDICINE CLINIC | Facility: CLINIC | Age: 57
End: 2022-06-24
Payer: COMMERCIAL

## 2022-06-24 VITALS
HEIGHT: 70 IN | SYSTOLIC BLOOD PRESSURE: 142 MMHG | BODY MASS INDEX: 45.1 KG/M2 | TEMPERATURE: 98 F | WEIGHT: 315 LBS | HEART RATE: 78 BPM | RESPIRATION RATE: 20 BRPM | OXYGEN SATURATION: 95 % | DIASTOLIC BLOOD PRESSURE: 92 MMHG

## 2022-06-24 DIAGNOSIS — Z91.19 NONCOMPLIANCE: ICD-10-CM

## 2022-06-24 DIAGNOSIS — E11.65 UNCONTROLLED TYPE 2 DIABETES MELLITUS WITH HYPERGLYCEMIA (HCC): Primary | ICD-10-CM

## 2022-06-24 DIAGNOSIS — I10 ESSENTIAL HYPERTENSION: ICD-10-CM

## 2022-06-24 PROCEDURE — 3008F BODY MASS INDEX DOCD: CPT | Performed by: FAMILY MEDICINE

## 2022-06-24 PROCEDURE — 3077F SYST BP >= 140 MM HG: CPT | Performed by: FAMILY MEDICINE

## 2022-06-24 PROCEDURE — 3080F DIAST BP >= 90 MM HG: CPT | Performed by: FAMILY MEDICINE

## 2022-06-24 PROCEDURE — 99214 OFFICE O/P EST MOD 30 MIN: CPT | Performed by: FAMILY MEDICINE

## 2022-06-24 RX ORDER — CICLOPIROX 7.7 MG/G
1 GEL TOPICAL 2 TIMES DAILY
COMMUNITY
Start: 2022-06-06

## 2022-06-24 NOTE — PATIENT INSTRUCTIONS
rec retry metformin 500mg  1-2 x a day  Report bs in 2 weeks-- fasting  Am,     Consider adding ACTOS-- if glucose not improving    Encourage stetching, physical actvity

## 2022-06-29 RX ORDER — ATORVASTATIN CALCIUM 10 MG/1
TABLET, FILM COATED ORAL
Qty: 90 TABLET | Refills: 3 | Status: SHIPPED | OUTPATIENT
Start: 2022-06-29

## 2022-06-29 RX ORDER — FUROSEMIDE 20 MG/1
TABLET ORAL
Qty: 60 TABLET | Refills: 3 | Status: SHIPPED | OUTPATIENT
Start: 2022-06-29

## 2022-06-29 NOTE — TELEPHONE ENCOUNTER
Future appt:    Last Appointment with provider:   6/24/2022 - dm  Last appointment at [facility] [place]:  6/24/2022      Last refill atorvastatin - 3/14/22 - qty 90 w/0 refills  Last refill furosemide - 5/17/22 - qty 60 w/0 refills    Last px - 11/22/21    Cholesterol, Total (mg/dL)   Date Value   06/22/2022 138     HDL Cholesterol (mg/dL)   Date Value   06/22/2022 32 (L)     LDL Cholesterol (mg/dL)   Date Value   06/22/2022 81     Triglycerides (mg/dL)   Date Value   06/22/2022 142     Lab Results   Component Value Date     (H) 06/22/2022    A1C 8.6 (H) 06/22/2022     Lab Results   Component Value Date    T4F 0.9 11/18/2021    TSH 3.620 06/22/2022       No follow-ups on file.

## 2022-07-29 ENCOUNTER — TELEPHONE (OUTPATIENT)
Dept: FAMILY MEDICINE CLINIC | Facility: CLINIC | Age: 57
End: 2022-07-29

## 2022-07-29 NOTE — TELEPHONE ENCOUNTER
Pt's wife dropped off pt's BS log. Log dated 7/3/22- 7/18/22. BS range: 133-161. Reviewed by CR.   Pt to CPM.

## 2022-08-01 RX ORDER — LOSARTAN POTASSIUM 50 MG/1
TABLET ORAL
Qty: 180 TABLET | Refills: 3 | Status: SHIPPED | OUTPATIENT
Start: 2022-08-01

## 2022-08-01 NOTE — TELEPHONE ENCOUNTER
Future appt:    Last Appointment with provider:   6/24/2022  Last appointment at [facility]:  6/24/2022  Last px: 11/22/21    Pt will be due for px in the fall. Losartan refilled 12/29/21 for #180, 1 refill  Cholesterol, Total (mg/dL)   Date Value   06/22/2022 138     HDL Cholesterol (mg/dL)   Date Value   06/22/2022 32 (L)     LDL Cholesterol (mg/dL)   Date Value   06/22/2022 81     Triglycerides (mg/dL)   Date Value   06/22/2022 142     Lab Results   Component Value Date     (H) 06/22/2022    A1C 8.6 (H) 06/22/2022     Lab Results   Component Value Date    T4F 0.9 11/18/2021    TSH 3.620 06/22/2022       No follow-ups on file.

## 2022-08-29 RX ORDER — METFORMIN HYDROCHLORIDE 750 MG/1
TABLET, EXTENDED RELEASE ORAL
Qty: 180 TABLET | Refills: 2 | Status: SHIPPED | OUTPATIENT
Start: 2022-08-29

## 2022-08-29 NOTE — TELEPHONE ENCOUNTER
Future appt:    Last Appointment with provider:   6/24/2022 for diabetes follow up. Last appointment at [facility] [place]:  6/24/2022  Cholesterol, Total (mg/dL)   Date Value   06/22/2022 138     HDL Cholesterol (mg/dL)   Date Value   06/22/2022 32 (L)     LDL Cholesterol (mg/dL)   Date Value   06/22/2022 81     Triglycerides (mg/dL)   Date Value   06/22/2022 142     Lab Results   Component Value Date     (H) 06/22/2022    A1C 8.6 (H) 06/22/2022     Lab Results   Component Value Date    T4F 0.9 11/18/2021    TSH 3.620 06/22/2022       No follow-ups on file.

## 2022-09-14 RX ORDER — LEVOTHYROXINE SODIUM 125 UG/1
TABLET ORAL
Qty: 90 TABLET | Refills: 2 | Status: SHIPPED | OUTPATIENT
Start: 2022-09-14

## 2022-09-14 NOTE — TELEPHONE ENCOUNTER
Future appt:    Last Appointment with provider:   6/24/2022  Last appointment at [facility] [place]:  6/24/2022    Last physical 11/22/21    Last refill 11/22/21 #90 with 1 refill       Cholesterol, Total (mg/dL)   Date Value   06/22/2022 138     HDL Cholesterol (mg/dL)   Date Value   06/22/2022 32 (L)     LDL Cholesterol (mg/dL)   Date Value   06/22/2022 81     Triglycerides (mg/dL)   Date Value   06/22/2022 142     Lab Results   Component Value Date     (H) 06/22/2022    A1C 8.6 (H) 06/22/2022     Lab Results   Component Value Date    T4F 0.9 11/18/2021    TSH 3.620 06/22/2022       No follow-ups on file.

## 2022-11-01 NOTE — TELEPHONE ENCOUNTER
Future appt:    Last Appointment with provider:   6/24/2022  Last appointment at [facility] [place]:  6/24/2022  Last px: 11/22/21    Furosemide refilled 6/29/22, #60, 3 refills    Pt due for px this month    LM for pt-  Cholesterol, Total (mg/dL)   Date Value   06/22/2022 138     HDL Cholesterol (mg/dL)   Date Value   06/22/2022 32 (L)     LDL Cholesterol (mg/dL)   Date Value   06/22/2022 81     Triglycerides (mg/dL)   Date Value   06/22/2022 142     Lab Results   Component Value Date     (H) 06/22/2022    A1C 8.6 (H) 06/22/2022     Lab Results   Component Value Date    T4F 0.9 11/18/2021    TSH 3.620 06/22/2022       No follow-ups on file.

## 2022-11-02 RX ORDER — FUROSEMIDE 20 MG/1
TABLET ORAL
Qty: 60 TABLET | Refills: 1 | Status: SHIPPED | OUTPATIENT
Start: 2022-11-02

## 2022-11-02 NOTE — TELEPHONE ENCOUNTER
[facility] Nurse Pool  Caller: Unspecified (Today, 10:45 AM)  Patient returned your call -- physical has been scheduled, he has further questions        Future Appointments   Date Time Provider Willem Romero   12/2/2022  2:00 PM Jemal Alvarez MD [facility] [place]

## 2022-12-12 ENCOUNTER — OFFICE VISIT (OUTPATIENT)
Dept: FAMILY MEDICINE CLINIC | Facility: CLINIC | Age: 57
End: 2022-12-12
Payer: COMMERCIAL

## 2022-12-12 VITALS
OXYGEN SATURATION: 97 % | RESPIRATION RATE: 20 BRPM | HEART RATE: 81 BPM | WEIGHT: 315 LBS | TEMPERATURE: 98 F | HEIGHT: 70 IN | DIASTOLIC BLOOD PRESSURE: 92 MMHG | BODY MASS INDEX: 45.1 KG/M2 | SYSTOLIC BLOOD PRESSURE: 140 MMHG

## 2022-12-12 DIAGNOSIS — I87.2 VENOUS STASIS DERMATITIS OF BOTH LOWER EXTREMITIES: ICD-10-CM

## 2022-12-12 DIAGNOSIS — C67.2 MALIGNANT NEOPLASM OF LATERAL WALL OF URINARY BLADDER (HCC): ICD-10-CM

## 2022-12-12 DIAGNOSIS — E11.9 TYPE 2 DIABETES MELLITUS WITHOUT COMPLICATION, WITHOUT LONG-TERM CURRENT USE OF INSULIN (HCC): ICD-10-CM

## 2022-12-12 DIAGNOSIS — E78.49 FAMILIAL MULTIPLE LIPOPROTEIN-TYPE HYPERLIPIDEMIA: ICD-10-CM

## 2022-12-12 DIAGNOSIS — I10 ESSENTIAL HYPERTENSION: ICD-10-CM

## 2022-12-12 DIAGNOSIS — Z00.00 ANNUAL PHYSICAL EXAM: Primary | ICD-10-CM

## 2022-12-12 DIAGNOSIS — M51.37 DEGENERATION OF LUMBAR OR LUMBOSACRAL INTERVERTEBRAL DISC: ICD-10-CM

## 2022-12-12 DIAGNOSIS — N40.0 BENIGN PROSTATIC HYPERPLASIA, UNSPECIFIED WHETHER LOWER URINARY TRACT SYMPTOMS PRESENT: ICD-10-CM

## 2022-12-12 DIAGNOSIS — E03.8 HYPOTHYROIDISM DUE TO HASHIMOTO'S THYROIDITIS: ICD-10-CM

## 2022-12-12 DIAGNOSIS — M25.562 ACUTE PAIN OF LEFT KNEE: ICD-10-CM

## 2022-12-12 DIAGNOSIS — G47.33 OBSTRUCTIVE SLEEP APNEA (ADULT) (PEDIATRIC): ICD-10-CM

## 2022-12-12 DIAGNOSIS — G47.61 PERIODIC LIMB MOVEMENT DISORDER: ICD-10-CM

## 2022-12-12 DIAGNOSIS — E66.01 CLASS 3 SEVERE OBESITY DUE TO EXCESS CALORIES WITH SERIOUS COMORBIDITY AND BODY MASS INDEX (BMI) OF 45.0 TO 49.9 IN ADULT (HCC): ICD-10-CM

## 2022-12-12 DIAGNOSIS — E06.3 HYPOTHYROIDISM DUE TO HASHIMOTO'S THYROIDITIS: ICD-10-CM

## 2022-12-12 PROBLEM — L91.8 SKIN TAG: Status: RESOLVED | Noted: 2018-08-08 | Resolved: 2022-12-12

## 2022-12-12 RX ORDER — METFORMIN HYDROCHLORIDE 500 MG/1
1000 TABLET, EXTENDED RELEASE ORAL
Qty: 180 TABLET | Refills: 0 | Status: SHIPPED | OUTPATIENT
Start: 2022-12-12

## 2022-12-12 NOTE — PATIENT INSTRUCTIONS
Rec vaccines  Covid  Flu  Pneumonia  Shingles  Tdap      Rec fasting  labs    Rec incrtease metformin to 1000mg a day    Consider ozempic if HGBA!c > 8    Rec yearly eye exam    Continue care urology    Rec knee range of motion, ice after activity

## 2022-12-27 RX ORDER — FUROSEMIDE 20 MG/1
TABLET ORAL
Qty: 180 TABLET | Refills: 3 | Status: SHIPPED | OUTPATIENT
Start: 2022-12-27

## 2022-12-27 NOTE — TELEPHONE ENCOUNTER
Last Furosemide Refill: 11/2/2022 #60 with 1 refill  Last Px: 12/12/2022    Future appt:    Last Appointment with provider:   12/12/2022  Last appointment at [facility] [place]:  12/12/2022  Cholesterol, Total (mg/dL)   Date Value   06/22/2022 138     HDL Cholesterol (mg/dL)   Date Value   06/22/2022 32 (L)     LDL Cholesterol (mg/dL)   Date Value   06/22/2022 81     Triglycerides (mg/dL)   Date Value   06/22/2022 142     Lab Results   Component Value Date     (H) 06/22/2022    A1C 8.6 (H) 06/22/2022     Lab Results   Component Value Date    T4F 0.9 11/18/2021    TSH 3.620 06/22/2022       No follow-ups on file.

## 2023-01-18 ENCOUNTER — TELEPHONE (OUTPATIENT)
Dept: FAMILY MEDICINE CLINIC | Facility: CLINIC | Age: 58
End: 2023-01-18

## 2023-02-06 ENCOUNTER — OFFICE VISIT (OUTPATIENT)
Dept: FAMILY MEDICINE CLINIC | Facility: CLINIC | Age: 58
End: 2023-02-06
Payer: COMMERCIAL

## 2023-02-06 VITALS
TEMPERATURE: 98 F | WEIGHT: 315 LBS | SYSTOLIC BLOOD PRESSURE: 136 MMHG | HEIGHT: 70 IN | DIASTOLIC BLOOD PRESSURE: 86 MMHG | RESPIRATION RATE: 20 BRPM | OXYGEN SATURATION: 96 % | HEART RATE: 65 BPM | BODY MASS INDEX: 45.1 KG/M2

## 2023-02-06 DIAGNOSIS — G47.33 OBSTRUCTIVE SLEEP APNEA (ADULT) (PEDIATRIC): Primary | ICD-10-CM

## 2023-02-06 PROCEDURE — 3008F BODY MASS INDEX DOCD: CPT | Performed by: NURSE PRACTITIONER

## 2023-02-06 PROCEDURE — 3075F SYST BP GE 130 - 139MM HG: CPT | Performed by: NURSE PRACTITIONER

## 2023-02-06 PROCEDURE — 3079F DIAST BP 80-89 MM HG: CPT | Performed by: NURSE PRACTITIONER

## 2023-02-06 PROCEDURE — 99214 OFFICE O/P EST MOD 30 MIN: CPT | Performed by: NURSE PRACTITIONER

## 2023-08-08 RX ORDER — LEVOTHYROXINE SODIUM 0.12 MG/1
125 TABLET ORAL DAILY
Qty: 90 TABLET | Refills: 0 | Status: SHIPPED | OUTPATIENT
Start: 2023-08-08

## 2023-08-08 RX ORDER — LOSARTAN POTASSIUM 50 MG/1
100 TABLET ORAL DAILY
Qty: 180 TABLET | Refills: 0 | Status: SHIPPED | OUTPATIENT
Start: 2023-08-08

## 2023-08-08 RX ORDER — METFORMIN HYDROCHLORIDE 500 MG/1
1000 TABLET, EXTENDED RELEASE ORAL
Qty: 180 TABLET | Refills: 0 | Status: SHIPPED | OUTPATIENT
Start: 2023-08-08

## 2023-08-08 NOTE — TELEPHONE ENCOUNTER
Losartan: 8/1/22  Levothyroxine: 9/14/22  Metformin: 12/12/22    No return date given    Future appt:    Last Appointment with provider:   12/12/22  Last appointment at [facility] [place]:  Visit date not found  Cholesterol, Total (mg/dL)   Date Value   06/22/2022 138     HDL Cholesterol (mg/dL)   Date Value   06/22/2022 32 (L)     LDL Cholesterol (mg/dL)   Date Value   06/22/2022 81     Triglycerides (mg/dL)   Date Value   06/22/2022 142     Lab Results   Component Value Date     (H) 06/22/2022    A1C 8.6 (H) 06/22/2022     Lab Results   Component Value Date    T4F 0.9 11/18/2021    TSH 3.620 06/22/2022       No follow-ups on file.

## 2023-08-27 DIAGNOSIS — E78.49 FAMILIAL MULTIPLE LIPOPROTEIN-TYPE HYPERLIPIDEMIA: Primary | ICD-10-CM

## 2023-08-28 RX ORDER — ATORVASTATIN CALCIUM 10 MG/1
TABLET, FILM COATED ORAL
Qty: 90 TABLET | Refills: 0 | Status: SHIPPED | OUTPATIENT
Start: 2023-08-28

## 2023-08-28 NOTE — TELEPHONE ENCOUNTER
Reviewed.  Pt will need to schedule appt for medical f.u as well  1 refil given  Future Appointments   Date Time Provider Willem Romero   9/7/2023  9:00 AM REF [place] [facility]

## 2023-08-28 NOTE — TELEPHONE ENCOUNTER
Future appt:    Last Appointment with provider:   Visit date not found  Last appointment at [facility] [place]:  Visit date not found  Last px: 12/12/22    Overdue for labs    Atorvastatin refilled 6/29/22 for one year      Pt contacted-  Pt scheduled appt for labs next week  Pt is out of cholesterol medication tomorrow. Future Appointments   Date Time Provider Willem Romero   9/7/2023  9:00 AM REF [place] [facility]         Cholesterol, Total (mg/dL)   Date Value   06/22/2022 138     HDL Cholesterol (mg/dL)   Date Value   06/22/2022 32 (L)     LDL Cholesterol (mg/dL)   Date Value   06/22/2022 81     Triglycerides (mg/dL)   Date Value   06/22/2022 142     Lab Results   Component Value Date     (H) 06/22/2022    A1C 8.6 (H) 06/22/2022     Lab Results   Component Value Date    T4F 0.9 11/18/2021    TSH 3.620 06/22/2022       No follow-ups on file.

## 2023-09-06 NOTE — TELEPHONE ENCOUNTER
Future Appointments   Date Time Provider Willem Heather   9/15/2023  9:15 AM REF [place] [facility]   10/11/2023  3:00 PM Beverley Bianchi MD [facility] [place] [facility] [place]

## 2023-09-15 ENCOUNTER — LABORATORY ENCOUNTER (OUTPATIENT)
Dept: LAB | Age: 58
End: 2023-09-15
Attending: FAMILY MEDICINE
Payer: COMMERCIAL

## 2023-09-15 DIAGNOSIS — E11.9 TYPE 2 DIABETES MELLITUS WITHOUT COMPLICATION, WITHOUT LONG-TERM CURRENT USE OF INSULIN (HCC): ICD-10-CM

## 2023-09-15 DIAGNOSIS — E03.8 HYPOTHYROIDISM DUE TO HASHIMOTO'S THYROIDITIS: ICD-10-CM

## 2023-09-15 DIAGNOSIS — Z00.00 ANNUAL PHYSICAL EXAM: ICD-10-CM

## 2023-09-15 DIAGNOSIS — E06.3 HYPOTHYROIDISM DUE TO HASHIMOTO'S THYROIDITIS: ICD-10-CM

## 2023-09-15 LAB
ALBUMIN SERPL-MCNC: 3.9 G/DL (ref 3.4–5)
ALBUMIN/GLOB SERPL: 1.1 {RATIO} (ref 1–2)
ALP LIVER SERPL-CCNC: 56 U/L
ALT SERPL-CCNC: 65 U/L
ANION GAP SERPL CALC-SCNC: 5 MMOL/L (ref 0–18)
AST SERPL-CCNC: 33 U/L (ref 15–37)
BASOPHILS # BLD AUTO: 0.08 X10(3) UL (ref 0–0.2)
BASOPHILS NFR BLD AUTO: 1 %
BILIRUB SERPL-MCNC: 0.5 MG/DL (ref 0.1–2)
BILIRUB UR QL STRIP.AUTO: NEGATIVE
BUN BLD-MCNC: 12 MG/DL (ref 7–18)
CALCIUM BLD-MCNC: 9.3 MG/DL (ref 8.5–10.1)
CHLORIDE SERPL-SCNC: 105 MMOL/L (ref 98–112)
CHOLEST SERPL-MCNC: 143 MG/DL (ref ?–200)
CLARITY UR REFRACT.AUTO: CLEAR
CO2 SERPL-SCNC: 29 MMOL/L (ref 21–32)
COLOR UR AUTO: YELLOW
CREAT BLD-MCNC: 1.04 MG/DL
CREAT UR-SCNC: 182 MG/DL
EGFRCR SERPLBLD CKD-EPI 2021: 84 ML/MIN/1.73M2 (ref 60–?)
EOSINOPHIL # BLD AUTO: 0.32 X10(3) UL (ref 0–0.7)
EOSINOPHIL NFR BLD AUTO: 3.9 %
ERYTHROCYTE [DISTWIDTH] IN BLOOD BY AUTOMATED COUNT: 12.7 %
EST. AVERAGE GLUCOSE BLD GHB EST-MCNC: 220 MG/DL (ref 68–126)
FASTING PATIENT LIPID ANSWER: YES
FASTING STATUS PATIENT QL REPORTED: YES
GLOBULIN PLAS-MCNC: 3.6 G/DL (ref 2.8–4.4)
GLUCOSE BLD-MCNC: 194 MG/DL (ref 70–99)
GLUCOSE UR STRIP.AUTO-MCNC: 500 MG/DL
HBA1C MFR BLD: 9.3 % (ref ?–5.7)
HCT VFR BLD AUTO: 45.4 %
HDLC SERPL-MCNC: 32 MG/DL (ref 40–59)
HGB BLD-MCNC: 14.9 G/DL
IMM GRANULOCYTES # BLD AUTO: 0.04 X10(3) UL (ref 0–1)
IMM GRANULOCYTES NFR BLD: 0.5 %
KETONES UR STRIP.AUTO-MCNC: NEGATIVE MG/DL
LDLC SERPL CALC-MCNC: 84 MG/DL (ref ?–100)
LEUKOCYTE ESTERASE UR QL STRIP.AUTO: NEGATIVE
LYMPHOCYTES # BLD AUTO: 2.33 X10(3) UL (ref 1–4)
LYMPHOCYTES NFR BLD AUTO: 28.1 %
MCH RBC QN AUTO: 30.5 PG (ref 26–34)
MCHC RBC AUTO-ENTMCNC: 32.8 G/DL (ref 31–37)
MCV RBC AUTO: 92.8 FL
MICROALBUMIN UR-MCNC: 3 MG/DL
MICROALBUMIN/CREAT 24H UR-RTO: 16.5 UG/MG (ref ?–30)
MONOCYTES # BLD AUTO: 0.62 X10(3) UL (ref 0.1–1)
MONOCYTES NFR BLD AUTO: 7.5 %
NEUTROPHILS # BLD AUTO: 4.89 X10 (3) UL (ref 1.5–7.7)
NEUTROPHILS # BLD AUTO: 4.89 X10(3) UL (ref 1.5–7.7)
NEUTROPHILS NFR BLD AUTO: 59 %
NITRITE UR QL STRIP.AUTO: NEGATIVE
NONHDLC SERPL-MCNC: 111 MG/DL (ref ?–130)
OSMOLALITY SERPL CALC.SUM OF ELEC: 293 MOSM/KG (ref 275–295)
PH UR STRIP.AUTO: 6 [PH] (ref 5–8)
PLATELET # BLD AUTO: 210 10(3)UL (ref 150–450)
POTASSIUM SERPL-SCNC: 4.5 MMOL/L (ref 3.5–5.1)
PROT SERPL-MCNC: 7.5 G/DL (ref 6.4–8.2)
RBC # BLD AUTO: 4.89 X10(6)UL
RBC UR QL AUTO: NEGATIVE
SODIUM SERPL-SCNC: 139 MMOL/L (ref 136–145)
SP GR UR STRIP.AUTO: 1.02 (ref 1–1.03)
TRIGL SERPL-MCNC: 155 MG/DL (ref 30–149)
TSI SER-ACNC: 2.82 MIU/ML (ref 0.36–3.74)
UROBILINOGEN UR STRIP.AUTO-MCNC: NORMAL MG/DL
VIT D+METAB SERPL-MCNC: 31.4 NG/ML (ref 30–100)
VLDLC SERPL CALC-MCNC: 25 MG/DL (ref 0–30)
WBC # BLD AUTO: 8.3 X10(3) UL (ref 4–11)

## 2023-09-15 PROCEDURE — 82043 UR ALBUMIN QUANTITATIVE: CPT | Performed by: FAMILY MEDICINE

## 2023-09-15 PROCEDURE — 80050 GENERAL HEALTH PANEL: CPT | Performed by: FAMILY MEDICINE

## 2023-09-15 PROCEDURE — 80061 LIPID PANEL: CPT | Performed by: FAMILY MEDICINE

## 2023-09-15 PROCEDURE — 82306 VITAMIN D 25 HYDROXY: CPT | Performed by: FAMILY MEDICINE

## 2023-09-15 PROCEDURE — 81001 URINALYSIS AUTO W/SCOPE: CPT | Performed by: FAMILY MEDICINE

## 2023-09-15 PROCEDURE — 82570 ASSAY OF URINE CREATININE: CPT | Performed by: FAMILY MEDICINE

## 2023-09-15 PROCEDURE — 83036 HEMOGLOBIN GLYCOSYLATED A1C: CPT | Performed by: FAMILY MEDICINE

## 2024-01-09 NOTE — PATIENT INSTRUCTIONS
Directed to take antibiotics until gone. Recommend to eat with the antibiotic. Treat symptoms as needed. Return to clinic if not better in 48-72 hours. Otherwise follow-up as needed.
No

## (undated) NOTE — LETTER
06/03/19        [address]      Dear Adrianna Napier records indicate that you have outstanding lab work and or testing that was ordered for you and has not yet been completed:        Hemoglobin A1C [E]          To pr

## (undated) NOTE — MR AVS SNAPSHOT
[address]  [place] [address] [id number]  [phone]               Thank you for choosing us for your health care visit with Saturnino Miller MD.  We are glad to serve you and happy to provide you with this summary This list is accurate as of: 5/10/17  3:23 PM.  Always use your most recent med list.                * PROAIR  (90 Base) MCG/ACT Aers   Generic drug:  Albuterol Sulfate HFA           * albuterol sulfate (2.5 MG/3ML) 0.083% Nebu   Commonly known as: Support Staff. Remember, MyChart is NOT to be used for urgent needs. For medical emergencies, dial 911.            Visit [facility] online at  [email or web address]

## (undated) NOTE — LETTER
05/17/18        [address]      Dear Nae Coates records indicate that you have outstanding lab work and or testing that was ordered for you and has not yet been completed:          Comp Metabolic Panel (14) [E]

## (undated) NOTE — LETTER
01/16/21        [address]      Dear Flora Hyde records indicate that you have outstanding lab work and or testing that was ordered for you and has not yet been completed:  Orders Placed This Encounter      Comp

## (undated) NOTE — MR AVS SNAPSHOT
[address]  [place] [phone]  [phone]               Thank you for choosing us for your health care visit with Titus Mejia MD.  We are glad to serve you and happy to provide you with this summary * Notice: This list has 2 medication(s) that are the same as other medications prescribed for you. Read the directions carefully, and ask your doctor or other care provider to review them with you.             Today's Orders     TSH W Reflex To Free T4 [E Water is best for hydration Fast food. Eat at home when possible     Tips for increasing your physical activity – Adults who are physically active are less likely to develop some chronic diseases than adults who are inactive.      HOW TO GET STARTED: HOW

## (undated) NOTE — LETTER
10/21/20        [address]      Dear Ronda Faulkner records indicate that you have outstanding lab work and or testing that was ordered for you and has not yet been completed:  Orders Placed This Encounter          M